# Patient Record
Sex: FEMALE | Race: WHITE | Employment: FULL TIME | URBAN - METROPOLITAN AREA
[De-identification: names, ages, dates, MRNs, and addresses within clinical notes are randomized per-mention and may not be internally consistent; named-entity substitution may affect disease eponyms.]

---

## 2017-01-10 ENCOUNTER — ROUTINE PRENATAL (OUTPATIENT)
Dept: OBGYN CLINIC | Facility: CLINIC | Age: 32
End: 2017-01-10

## 2017-01-10 VITALS — WEIGHT: 143 LBS | SYSTOLIC BLOOD PRESSURE: 96 MMHG | BODY MASS INDEX: 22 KG/M2 | DIASTOLIC BLOOD PRESSURE: 56 MMHG

## 2017-01-10 DIAGNOSIS — Z34.82 OTHER NORMAL PREGNANCY, NOT FIRST, SECOND TRIMESTER: Primary | ICD-10-CM

## 2017-01-10 PROBLEM — Z28.21 INFLUENZA VACCINATION DECLINED: Status: ACTIVE | Noted: 2017-01-10

## 2017-01-10 PROBLEM — O09.899 HISTORY OF PRETERM DELIVERY, CURRENTLY PREGNANT: Status: ACTIVE | Noted: 2017-01-10

## 2017-01-10 LAB
APPEARANCE: CLEAR
MULTISTIX LOT#: NORMAL NUMERIC
PH, URINE: 6 (ref 4.5–8)
SPECIFIC GRAVITY: 1.02 (ref 1–1.03)
URINE-COLOR: YELLOW
UROBILINOGEN,SEMI-QN: 0.2 MG/DL (ref 0–1.9)

## 2017-01-10 NOTE — PROGRESS NOTES
Had normal 1st tri screen. Normal FFDNA, it's another boy. AFP ordered. Declines flu vaccine. Lost 2# since last week. Denies any nausea or vomiting. Has been stressed as  quit on them.

## 2017-02-07 ENCOUNTER — TELEPHONE (OUTPATIENT)
Dept: OBGYN CLINIC | Facility: CLINIC | Age: 32
End: 2017-02-07

## 2017-02-07 DIAGNOSIS — Z34.82 ENCOUNTER FOR SUPERVISION OF OTHER NORMAL PREGNANCY IN SECOND TRIMESTER: Primary | ICD-10-CM

## 2017-02-08 ENCOUNTER — PATIENT MESSAGE (OUTPATIENT)
Dept: OBGYN CLINIC | Facility: CLINIC | Age: 32
End: 2017-02-08

## 2017-02-10 ENCOUNTER — TELEPHONE (OUTPATIENT)
Dept: OBGYN CLINIC | Facility: CLINIC | Age: 32
End: 2017-02-10

## 2017-02-10 NOTE — TELEPHONE ENCOUNTER
Lmtcb. 19w6d. On 1/10/17 pt had AFP ordered by NGUYỄN and it has not been completed yet. Lab postponed for 1 month.

## 2017-02-13 ENCOUNTER — ROUTINE PRENATAL (OUTPATIENT)
Dept: OBGYN CLINIC | Facility: CLINIC | Age: 32
End: 2017-02-13

## 2017-02-13 VITALS
WEIGHT: 154 LBS | SYSTOLIC BLOOD PRESSURE: 120 MMHG | BODY MASS INDEX: 24 KG/M2 | DIASTOLIC BLOOD PRESSURE: 60 MMHG | HEART RATE: 97 BPM

## 2017-02-13 DIAGNOSIS — Z34.92 ENCOUNTER FOR SUPERVISION OF NORMAL PREGNANCY IN SECOND TRIMESTER, UNSPECIFIED GRAVIDITY: Primary | ICD-10-CM

## 2017-02-13 LAB
MULTISTIX LOT#: NORMAL NUMERIC
PH, URINE: 5 (ref 4.5–8)
SPECIFIC GRAVITY: 1.01 (ref 1–1.03)

## 2017-02-13 NOTE — PROGRESS NOTES
Feeling well, + fetal movement. Has [facility] u/s scheduled this Thursday. Denies s/s PTL. Reviewed warning signs.

## 2017-02-16 ENCOUNTER — HOSPITAL ENCOUNTER (OUTPATIENT)
Dept: PERINATAL CARE | Facility: HOSPITAL | Age: 32
Discharge: HOME | End: 2017-02-16
Attending: OBSTETRICS & GYNECOLOGY
Payer: COMMERCIAL

## 2017-02-16 VITALS
HEART RATE: 94 BPM | WEIGHT: 154 LBS | RESPIRATION RATE: 16 BRPM | SYSTOLIC BLOOD PRESSURE: 106 MMHG | BODY MASS INDEX: 24 KG/M2 | DIASTOLIC BLOOD PRESSURE: 65 MMHG

## 2017-02-16 DIAGNOSIS — O09.892 HISTORY OF PRETERM DELIVERY, CURRENTLY PREGNANT, SECOND TRIMESTER: ICD-10-CM

## 2017-02-16 DIAGNOSIS — O09.892 HISTORY OF PRETERM DELIVERY, CURRENTLY PREGNANT, SECOND TRIMESTER: Primary | ICD-10-CM

## 2017-02-16 PROCEDURE — 76811 OB US DETAILED SNGL FETUS: CPT | Performed by: OBSTETRICS & GYNECOLOGY

## 2017-02-16 PROCEDURE — 99213 OFFICE O/P EST LOW 20 MIN: CPT | Performed by: OBSTETRICS & GYNECOLOGY

## 2017-02-16 PROCEDURE — 76811 OB US DETAILED SNGL FETUS: CPT

## 2017-02-16 NOTE — PROGRESS NOTES
Atilio Powell is a 33year old female. Reason for Consult:   History of  delivery at 36wks due to PROM. Denies any problems prior to PROM. She had been moving furniture and then found out her Dad . That night her water broke.  No contra standard deviations below the mean for gestational age and considers the diagnosis of pathologic microcephaly certain when the head circumference is >5 standard deviations below the mean for gestational age.       Most people infected with Zika virus are as . In addition, mosquito-borne Zika virus transmission in [facility] has been extensive. There is also mosquito-borne transmission of Zika virus in the [place] territories of the [place] and [place].         Health c Magna, midline falx, cerebellum, cerebellar lobes, posterior fossa, vermis, cavum septi pellucidi.   Face: eyes normal, profile normal, nose normal, lip normal, palate normal.  Heart: visualized and normal appearance: 3 vessel view, four-chamber, left outfl

## 2017-02-16 NOTE — PROGRESS NOTES
[facility] level 2 ultrasound. Feeling well, no complaints offered. Positive fetal movement.

## 2017-03-24 ENCOUNTER — ROUTINE PRENATAL (OUTPATIENT)
Dept: OBGYN CLINIC | Facility: CLINIC | Age: 32
End: 2017-03-24

## 2017-03-24 VITALS
WEIGHT: 160.19 LBS | BODY MASS INDEX: 25 KG/M2 | DIASTOLIC BLOOD PRESSURE: 66 MMHG | SYSTOLIC BLOOD PRESSURE: 101 MMHG | HEART RATE: 81 BPM

## 2017-03-24 DIAGNOSIS — Z34.82 ENCOUNTER FOR SUPERVISION OF OTHER NORMAL PREGNANCY IN SECOND TRIMESTER: Primary | ICD-10-CM

## 2017-03-24 LAB
APPEARANCE: CLEAR
MULTISTIX LOT#: NORMAL NUMERIC
PH, URINE: 7 (ref 4.5–8)
SPECIFIC GRAVITY: 1.01 (ref 1–1.03)
URINE-COLOR: YELLOW
UROBILINOGEN,SEMI-QN: 0.2 MG/DL (ref 0–1.9)

## 2017-03-24 NOTE — PROGRESS NOTES
Feeling well, active baby. Reviewed normal level II ultrasound, CL 4.5. Denies s/s PTL. Orders given for 3T labs. Offer tdap at next visit. Reviewed warning signs.

## 2017-04-03 ENCOUNTER — APPOINTMENT (OUTPATIENT)
Dept: LAB | Facility: HOSPITAL | Age: 32
End: 2017-04-03
Attending: ADVANCED PRACTICE MIDWIFE
Payer: COMMERCIAL

## 2017-04-03 ENCOUNTER — ROUTINE PRENATAL (OUTPATIENT)
Dept: OBGYN CLINIC | Facility: CLINIC | Age: 32
End: 2017-04-03

## 2017-04-03 VITALS
HEART RATE: 85 BPM | HEIGHT: 67 IN | SYSTOLIC BLOOD PRESSURE: 108 MMHG | BODY MASS INDEX: 25.58 KG/M2 | DIASTOLIC BLOOD PRESSURE: 68 MMHG | WEIGHT: 163 LBS

## 2017-04-03 DIAGNOSIS — Z34.82 ENCOUNTER FOR SUPERVISION OF OTHER NORMAL PREGNANCY IN SECOND TRIMESTER: ICD-10-CM

## 2017-04-03 DIAGNOSIS — Z34.82 OTHER NORMAL PREGNANCY, NOT FIRST, SECOND TRIMESTER: Primary | ICD-10-CM

## 2017-04-03 DIAGNOSIS — Z28.20 VACCINE REFUSED BY PATIENT: ICD-10-CM

## 2017-04-03 PROCEDURE — 36415 COLL VENOUS BLD VENIPUNCTURE: CPT

## 2017-04-03 PROCEDURE — 82950 GLUCOSE TEST: CPT

## 2017-04-03 PROCEDURE — 85027 COMPLETE CBC AUTOMATED: CPT

## 2017-04-03 NOTE — PROGRESS NOTES
Active fetus. Denies abdominal pain, leaking of vaginal fluid or vaginal bleeding. Reports going to complete 3T labs today after ZAHRAA appointment. 28 week packet reviwed, declines Tdap and completed CBE with first child less than 2 years ago.   Reviewed P

## 2017-04-21 ENCOUNTER — ROUTINE PRENATAL (OUTPATIENT)
Dept: OBGYN CLINIC | Facility: CLINIC | Age: 32
End: 2017-04-21

## 2017-04-21 VITALS
DIASTOLIC BLOOD PRESSURE: 71 MMHG | SYSTOLIC BLOOD PRESSURE: 118 MMHG | HEIGHT: 67 IN | WEIGHT: 164.13 LBS | BODY MASS INDEX: 25.76 KG/M2 | HEART RATE: 96 BPM

## 2017-04-21 DIAGNOSIS — Z34.83 OTHER NORMAL PREGNANCY, NOT FIRST, THIRD TRIMESTER: Primary | ICD-10-CM

## 2017-04-21 NOTE — PROGRESS NOTES
Feeling well, active baby. Denies s/s PTL. Concerned regarding FH>dates at previous visits, today measurement within normal range. Patient will consider growth ultrasound if measurements abnormal.  Reviewed normal 3T labs and warning signs.

## 2017-04-22 ENCOUNTER — TELEPHONE (OUTPATIENT)
Dept: OBGYN CLINIC | Facility: CLINIC | Age: 32
End: 2017-04-22

## 2017-05-05 ENCOUNTER — ROUTINE PRENATAL (OUTPATIENT)
Dept: OBGYN CLINIC | Facility: CLINIC | Age: 32
End: 2017-05-05

## 2017-05-05 ENCOUNTER — APPOINTMENT (OUTPATIENT)
Dept: LAB | Facility: HOSPITAL | Age: 32
End: 2017-05-05
Attending: ADVANCED PRACTICE MIDWIFE
Payer: COMMERCIAL

## 2017-05-05 VITALS
DIASTOLIC BLOOD PRESSURE: 73 MMHG | WEIGHT: 167 LBS | BODY MASS INDEX: 26.21 KG/M2 | HEIGHT: 67 IN | HEART RATE: 89 BPM | SYSTOLIC BLOOD PRESSURE: 116 MMHG

## 2017-05-05 DIAGNOSIS — Z34.83 OTHER NORMAL PREGNANCY, NOT FIRST, THIRD TRIMESTER: Primary | ICD-10-CM

## 2017-05-05 DIAGNOSIS — L29.9 ITCHING: ICD-10-CM

## 2017-05-05 PROCEDURE — 82239 BILE ACIDS TOTAL: CPT

## 2017-05-05 PROCEDURE — 36415 COLL VENOUS BLD VENIPUNCTURE: CPT

## 2017-05-05 PROCEDURE — 84450 TRANSFERASE (AST) (SGOT): CPT

## 2017-05-05 PROCEDURE — 84460 ALANINE AMINO (ALT) (SGPT): CPT

## 2017-05-05 NOTE — PROGRESS NOTES
Active baby, denies s/s PTL. Reports small erythematous prutitic bumps on hands, denies rash or itching on any other area of body. Denies abdominal pain. Bile acids and LFTs ordered. FH>dates, growth ultrasound ordered. MFM referral placed by RN.   Rev

## 2017-05-06 ENCOUNTER — TELEPHONE (OUTPATIENT)
Dept: OBGYN CLINIC | Facility: CLINIC | Age: 32
End: 2017-05-06

## 2017-05-08 ENCOUNTER — TELEPHONE (OUTPATIENT)
Dept: PERINATAL CARE | Facility: HOSPITAL | Age: 32
End: 2017-05-08

## 2017-05-19 ENCOUNTER — ROUTINE PRENATAL (OUTPATIENT)
Dept: OBGYN CLINIC | Facility: CLINIC | Age: 32
End: 2017-05-19

## 2017-05-19 VITALS
DIASTOLIC BLOOD PRESSURE: 64 MMHG | SYSTOLIC BLOOD PRESSURE: 111 MMHG | HEIGHT: 67 IN | HEART RATE: 96 BPM | WEIGHT: 169 LBS | BODY MASS INDEX: 26.53 KG/M2

## 2017-05-19 DIAGNOSIS — Z34.83 PRENATAL CARE, SUBSEQUENT PREGNANCY, THIRD TRIMESTER: Primary | ICD-10-CM

## 2017-05-19 NOTE — PROGRESS NOTES
Feeling well, active baby. Denies s/s PTL. Itching slightly improved, reviewed normal LFTs and bile acids. Growth ultrasound scheduled for next week. GBS at next visit.

## 2017-05-25 ENCOUNTER — HOSPITAL ENCOUNTER (OUTPATIENT)
Dept: PERINATAL CARE | Facility: HOSPITAL | Age: 32
Discharge: HOME | End: 2017-05-25
Attending: OBSTETRICS & GYNECOLOGY
Payer: COMMERCIAL

## 2017-05-25 VITALS
HEART RATE: 78 BPM | WEIGHT: 169 LBS | RESPIRATION RATE: 16 BRPM | DIASTOLIC BLOOD PRESSURE: 75 MMHG | BODY MASS INDEX: 26.53 KG/M2 | SYSTOLIC BLOOD PRESSURE: 112 MMHG | HEIGHT: 67 IN

## 2017-05-25 DIAGNOSIS — O09.213 HX OF PTL (PRETERM LABOR), CURRENT PREGNANCY, THIRD TRIMESTER: Primary | ICD-10-CM

## 2017-05-25 DIAGNOSIS — O09.213 HX OF PTL (PRETERM LABOR), CURRENT PREGNANCY, THIRD TRIMESTER: ICD-10-CM

## 2017-05-25 PROCEDURE — 76805 OB US >/= 14 WKS SNGL FETUS: CPT | Performed by: OBSTETRICS & GYNECOLOGY

## 2017-05-25 PROCEDURE — 99212 OFFICE O/P EST SF 10 MIN: CPT | Performed by: OBSTETRICS & GYNECOLOGY

## 2017-05-25 NOTE — PROGRESS NOTES
Burgess Thompson is a 27year old female. Reason for Consult:   History of  delivery at 36wks due to PROM. Denies any problems prior to PROM. She had been moving furniture and then found out her Dad . That night her water broke.  No contra

## 2017-06-05 ENCOUNTER — ROUTINE PRENATAL (OUTPATIENT)
Dept: OBGYN CLINIC | Facility: CLINIC | Age: 32
End: 2017-06-05

## 2017-06-05 VITALS
WEIGHT: 171 LBS | DIASTOLIC BLOOD PRESSURE: 72 MMHG | SYSTOLIC BLOOD PRESSURE: 112 MMHG | BODY MASS INDEX: 26.84 KG/M2 | HEIGHT: 67 IN | HEART RATE: 88 BPM

## 2017-06-05 DIAGNOSIS — Z34.83 PRENATAL CARE, SUBSEQUENT PREGNANCY, THIRD TRIMESTER: Primary | ICD-10-CM

## 2017-06-05 NOTE — PROGRESS NOTES
Hand itching and bumps resolved. MFM ultrasound normal.  Probably will get an epidural.  Vit K and EES ok. Planning CB collection. Circ - yes. Inhouse Peds.   Rev 36 week packet/when to call

## 2017-06-14 ENCOUNTER — ROUTINE PRENATAL (OUTPATIENT)
Dept: OBGYN CLINIC | Facility: CLINIC | Age: 32
End: 2017-06-14

## 2017-06-14 VITALS
HEART RATE: 77 BPM | SYSTOLIC BLOOD PRESSURE: 113 MMHG | BODY MASS INDEX: 27 KG/M2 | DIASTOLIC BLOOD PRESSURE: 71 MMHG | WEIGHT: 170 LBS

## 2017-06-14 DIAGNOSIS — Z34.83 ENCOUNTER FOR SUPERVISION OF OTHER NORMAL PREGNANCY IN THIRD TRIMESTER: Primary | ICD-10-CM

## 2017-06-19 ENCOUNTER — ANESTHESIA (OUTPATIENT)
Dept: OBGYN UNIT | Facility: HOSPITAL | Age: 32
End: 2017-06-19
Payer: COMMERCIAL

## 2017-06-19 ENCOUNTER — ANESTHESIA EVENT (OUTPATIENT)
Dept: OBGYN UNIT | Facility: HOSPITAL | Age: 32
End: 2017-06-19
Payer: COMMERCIAL

## 2017-06-19 ENCOUNTER — HOSPITAL ENCOUNTER (INPATIENT)
Facility: HOSPITAL | Age: 32
LOS: 3 days | Discharge: HOME | End: 2017-06-22
Attending: ADVANCED PRACTICE MIDWIFE | Admitting: OBSTETRICS & GYNECOLOGY
Payer: COMMERCIAL

## 2017-06-19 ENCOUNTER — SURGERY (OUTPATIENT)
Age: 32
End: 2017-06-19
Payer: COMMERCIAL

## 2017-06-19 PROBLEM — Z34.90 TERM PREGNANCY: Status: ACTIVE | Noted: 2017-06-19

## 2017-06-19 PROBLEM — Z34.90 PREGNANCY: Status: ACTIVE | Noted: 2017-06-19

## 2017-06-19 PROCEDURE — 59510 CESAREAN DELIVERY: CPT | Performed by: OBSTETRICS & GYNECOLOGY

## 2017-06-19 PROCEDURE — 99252 IP/OBS CONSLTJ NEW/EST SF 35: CPT | Performed by: OBSTETRICS & GYNECOLOGY

## 2017-06-19 PROCEDURE — 10907ZC DRAINAGE OF AMNIOTIC FLUID, THERAPEUTIC FROM PRODUCTS OF CONCEPTION, VIA NATURAL OR ARTIFICIAL OPENING: ICD-10-PCS | Performed by: OBSTETRICS & GYNECOLOGY

## 2017-06-19 RX ORDER — HYDROMORPHONE HYDROCHLORIDE 1 MG/ML
0.4 INJECTION, SOLUTION INTRAMUSCULAR; INTRAVENOUS; SUBCUTANEOUS EVERY 5 MIN PRN
Status: DISCONTINUED | OUTPATIENT
Start: 2017-06-19 | End: 2017-06-22

## 2017-06-19 RX ORDER — HALOPERIDOL 5 MG/ML
0.25 INJECTION INTRAMUSCULAR ONCE AS NEEDED
Status: ACTIVE | OUTPATIENT
Start: 2017-06-19 | End: 2017-06-19

## 2017-06-19 RX ORDER — BISACODYL 10 MG
10 SUPPOSITORY, RECTAL RECTAL
Status: DISCONTINUED | OUTPATIENT
Start: 2017-06-19 | End: 2017-06-22

## 2017-06-19 RX ORDER — 0.9 % SODIUM CHLORIDE 0.9 %
VIAL (ML) INJECTION
Status: DISPENSED
Start: 2017-06-19 | End: 2017-06-19

## 2017-06-19 RX ORDER — NALBUPHINE HCL 10 MG/ML
2.5 AMPUL (ML) INJECTION
Status: DISCONTINUED | OUTPATIENT
Start: 2017-06-19 | End: 2017-06-22

## 2017-06-19 RX ORDER — SIMETHICONE 80 MG
80 TABLET,CHEWABLE ORAL 3 TIMES DAILY PRN
Status: DISCONTINUED | OUTPATIENT
Start: 2017-06-19 | End: 2017-06-22

## 2017-06-19 RX ORDER — HYDROMORPHONE HYDROCHLORIDE 1 MG/ML
0.6 INJECTION, SOLUTION INTRAMUSCULAR; INTRAVENOUS; SUBCUTANEOUS EVERY 5 MIN PRN
Status: DISCONTINUED | OUTPATIENT
Start: 2017-06-19 | End: 2017-06-22

## 2017-06-19 RX ORDER — SODIUM CHLORIDE 0.9 % (FLUSH) 0.9 %
10 SYRINGE (ML) INJECTION AS NEEDED
Status: DISCONTINUED | OUTPATIENT
Start: 2017-06-19 | End: 2017-06-19 | Stop reason: HOSPADM

## 2017-06-19 RX ORDER — MORPHINE SULFATE 2 MG/ML
2 INJECTION, SOLUTION INTRAMUSCULAR; INTRAVENOUS EVERY 10 MIN PRN
Status: DISCONTINUED | OUTPATIENT
Start: 2017-06-19 | End: 2017-06-22

## 2017-06-19 RX ORDER — NALOXONE HYDROCHLORIDE 0.4 MG/ML
80 INJECTION, SOLUTION INTRAMUSCULAR; INTRAVENOUS; SUBCUTANEOUS AS NEEDED
Status: ACTIVE | OUTPATIENT
Start: 2017-06-19 | End: 2017-06-19

## 2017-06-19 RX ORDER — AMMONIA INHALANTS 0.04 G/.3ML
0.3 INHALANT RESPIRATORY (INHALATION) AS NEEDED
Status: DISCONTINUED | OUTPATIENT
Start: 2017-06-19 | End: 2017-06-19 | Stop reason: HOSPADM

## 2017-06-19 RX ORDER — HYDROXYZINE HYDROCHLORIDE 50 MG/ML
INJECTION, SOLUTION INTRAMUSCULAR
Status: COMPLETED
Start: 2017-06-19 | End: 2017-06-19

## 2017-06-19 RX ORDER — DEXTROSE, SODIUM CHLORIDE, SODIUM LACTATE, POTASSIUM CHLORIDE, AND CALCIUM CHLORIDE 5; .6; .31; .03; .02 G/100ML; G/100ML; G/100ML; G/100ML; G/100ML
INJECTION, SOLUTION INTRAVENOUS CONTINUOUS
Status: DISCONTINUED | OUTPATIENT
Start: 2017-06-19 | End: 2017-06-19 | Stop reason: HOSPADM

## 2017-06-19 RX ORDER — POLYETHYLENE GLYCOL 3350 17 G/17G
17 POWDER, FOR SOLUTION ORAL DAILY PRN
Status: DISCONTINUED | OUTPATIENT
Start: 2017-06-19 | End: 2017-06-22

## 2017-06-19 RX ORDER — LIDOCAINE HYDROCHLORIDE AND EPINEPHRINE 20; 5 MG/ML; UG/ML
INJECTION, SOLUTION EPIDURAL; INFILTRATION; INTRACAUDAL; PERINEURAL
Status: DISCONTINUED
Start: 2017-06-19 | End: 2017-06-19 | Stop reason: WASHOUT

## 2017-06-19 RX ORDER — SODIUM CHLORIDE, SODIUM LACTATE, POTASSIUM CHLORIDE, CALCIUM CHLORIDE 600; 310; 30; 20 MG/100ML; MG/100ML; MG/100ML; MG/100ML
INJECTION, SOLUTION INTRAVENOUS CONTINUOUS
Status: DISCONTINUED | OUTPATIENT
Start: 2017-06-19 | End: 2017-06-22

## 2017-06-19 RX ORDER — PHENYLEPHRINE HCL IN 0.9% NACL 0.5 MG/5ML
SYRINGE (ML) INTRAVENOUS
Status: DISCONTINUED
Start: 2017-06-19 | End: 2017-06-19 | Stop reason: WASHOUT

## 2017-06-19 RX ORDER — AMMONIA INHALANTS 0.04 G/.3ML
0.3 INHALANT RESPIRATORY (INHALATION) AS NEEDED
Status: DISCONTINUED | OUTPATIENT
Start: 2017-06-19 | End: 2017-06-22

## 2017-06-19 RX ORDER — METOCLOPRAMIDE 10 MG/1
TABLET ORAL
Status: DISCONTINUED
Start: 2017-06-19 | End: 2017-06-19 | Stop reason: WASHOUT

## 2017-06-19 RX ORDER — DEXAMETHASONE SODIUM PHOSPHATE 4 MG/ML
VIAL (ML) INJECTION AS NEEDED
Status: DISCONTINUED | OUTPATIENT
Start: 2017-06-19 | End: 2017-06-19 | Stop reason: SURG

## 2017-06-19 RX ORDER — BUPIVACAINE HYDROCHLORIDE 2.5 MG/ML
INJECTION, SOLUTION EPIDURAL; INFILTRATION; INTRACAUDAL AS NEEDED
Status: DISCONTINUED | OUTPATIENT
Start: 2017-06-19 | End: 2017-06-19 | Stop reason: SURG

## 2017-06-19 RX ORDER — SODIUM CHLORIDE, SODIUM LACTATE, POTASSIUM CHLORIDE, CALCIUM CHLORIDE 600; 310; 30; 20 MG/100ML; MG/100ML; MG/100ML; MG/100ML
INJECTION, SOLUTION INTRAVENOUS
Status: DISPENSED
Start: 2017-06-19 | End: 2017-06-19

## 2017-06-19 RX ORDER — EPHEDRINE SULFATE 50 MG/ML
INJECTION, SOLUTION INTRAVENOUS AS NEEDED
Status: DISCONTINUED | OUTPATIENT
Start: 2017-06-19 | End: 2017-06-19 | Stop reason: SURG

## 2017-06-19 RX ORDER — NALBUPHINE HCL 10 MG/ML
10 AMPUL (ML) INJECTION ONCE
Status: COMPLETED | OUTPATIENT
Start: 2017-06-19 | End: 2017-06-19

## 2017-06-19 RX ORDER — SODIUM CHLORIDE 0.9 % (FLUSH) 0.9 %
10 SYRINGE (ML) INJECTION AS NEEDED
Status: DISCONTINUED | OUTPATIENT
Start: 2017-06-19 | End: 2017-06-22

## 2017-06-19 RX ORDER — IBUPROFEN 600 MG/1
600 TABLET ORAL ONCE AS NEEDED
Status: DISCONTINUED | OUTPATIENT
Start: 2017-06-19 | End: 2017-06-19 | Stop reason: HOSPADM

## 2017-06-19 RX ORDER — DEXTROSE, SODIUM CHLORIDE, SODIUM LACTATE, POTASSIUM CHLORIDE, AND CALCIUM CHLORIDE 5; .6; .31; .03; .02 G/100ML; G/100ML; G/100ML; G/100ML; G/100ML
125 INJECTION, SOLUTION INTRAVENOUS CONTINUOUS
Status: DISCONTINUED | OUTPATIENT
Start: 2017-06-19 | End: 2017-06-19 | Stop reason: HOSPADM

## 2017-06-19 RX ORDER — HYDROXYZINE HYDROCHLORIDE 50 MG/ML
50 INJECTION, SOLUTION INTRAMUSCULAR ONCE
Status: COMPLETED | OUTPATIENT
Start: 2017-06-19 | End: 2017-06-19

## 2017-06-19 RX ORDER — EPHEDRINE SULFATE/0.9% NACL/PF 25 MG/5 ML
5 SYRINGE (ML) INTRAVENOUS AS NEEDED
Status: DISCONTINUED | OUTPATIENT
Start: 2017-06-19 | End: 2017-06-19

## 2017-06-19 RX ORDER — HYDROMORPHONE HYDROCHLORIDE 1 MG/ML
0.2 INJECTION, SOLUTION INTRAMUSCULAR; INTRAVENOUS; SUBCUTANEOUS EVERY 5 MIN PRN
Status: DISCONTINUED | OUTPATIENT
Start: 2017-06-19 | End: 2017-06-22

## 2017-06-19 RX ORDER — DEXTROSE, SODIUM CHLORIDE, SODIUM LACTATE, POTASSIUM CHLORIDE, AND CALCIUM CHLORIDE 5; .6; .31; .03; .02 G/100ML; G/100ML; G/100ML; G/100ML; G/100ML
INJECTION, SOLUTION INTRAVENOUS CONTINUOUS
Status: DISCONTINUED | OUTPATIENT
Start: 2017-06-19 | End: 2017-06-22

## 2017-06-19 RX ORDER — ONDANSETRON 2 MG/ML
4 INJECTION INTRAMUSCULAR; INTRAVENOUS ONCE AS NEEDED
Status: ACTIVE | OUTPATIENT
Start: 2017-06-19 | End: 2017-06-19

## 2017-06-19 RX ORDER — MEPERIDINE HYDROCHLORIDE 50 MG/ML
INJECTION INTRAMUSCULAR; INTRAVENOUS; SUBCUTANEOUS AS NEEDED
Status: DISCONTINUED | OUTPATIENT
Start: 2017-06-19 | End: 2017-06-19 | Stop reason: SURG

## 2017-06-19 RX ORDER — DOCUSATE SODIUM 100 MG/1
100 CAPSULE, LIQUID FILLED ORAL
Status: DISCONTINUED | OUTPATIENT
Start: 2017-06-19 | End: 2017-06-20

## 2017-06-19 RX ORDER — ONDANSETRON 2 MG/ML
INJECTION INTRAMUSCULAR; INTRAVENOUS AS NEEDED
Status: DISCONTINUED | OUTPATIENT
Start: 2017-06-19 | End: 2017-06-19 | Stop reason: SURG

## 2017-06-19 RX ORDER — SODIUM CHLORIDE 9 MG/ML
INJECTION, SOLUTION INTRAVENOUS
Status: COMPLETED
Start: 2017-06-19 | End: 2017-06-19

## 2017-06-19 RX ORDER — PHENYLEPHRINE HCL 10 MG/ML
VIAL (ML) INJECTION AS NEEDED
Status: DISCONTINUED | OUTPATIENT
Start: 2017-06-19 | End: 2017-06-19 | Stop reason: SURG

## 2017-06-19 RX ORDER — SODIUM PHOSPHATE, DIBASIC AND SODIUM PHOSPHATE, MONOBASIC 7; 19 G/133ML; G/133ML
1 ENEMA RECTAL ONCE AS NEEDED
Status: DISCONTINUED | OUTPATIENT
Start: 2017-06-19 | End: 2017-06-22

## 2017-06-19 RX ORDER — LIDOCAINE HYDROCHLORIDE 10 MG/ML
30 INJECTION, SOLUTION EPIDURAL; INFILTRATION; INTRACAUDAL; PERINEURAL ONCE
Status: DISCONTINUED | OUTPATIENT
Start: 2017-06-19 | End: 2017-06-19 | Stop reason: HOSPADM

## 2017-06-19 RX ORDER — MORPHINE SULFATE 10 MG/ML
6 INJECTION, SOLUTION INTRAMUSCULAR; INTRAVENOUS EVERY 10 MIN PRN
Status: DISCONTINUED | OUTPATIENT
Start: 2017-06-19 | End: 2017-06-22

## 2017-06-19 RX ORDER — BUPIVACAINE HYDROCHLORIDE 2.5 MG/ML
INJECTION, SOLUTION EPIDURAL; INFILTRATION; INTRACAUDAL
Status: DISPENSED
Start: 2017-06-19 | End: 2017-06-19

## 2017-06-19 RX ORDER — KETOROLAC TROMETHAMINE 30 MG/ML
30 INJECTION, SOLUTION INTRAMUSCULAR; INTRAVENOUS EVERY 6 HOURS PRN
Status: DISCONTINUED | OUTPATIENT
Start: 2017-06-19 | End: 2017-06-20

## 2017-06-19 RX ORDER — ONDANSETRON 2 MG/ML
4 INJECTION INTRAMUSCULAR; INTRAVENOUS EVERY 6 HOURS PRN
Status: DISCONTINUED | OUTPATIENT
Start: 2017-06-19 | End: 2017-06-22

## 2017-06-19 RX ORDER — PRENATAL VIT,CAL 76/IRON/FOLIC 29 MG-1 MG
1 TABLET ORAL DAILY
Status: DISCONTINUED | OUTPATIENT
Start: 2017-06-19 | End: 2017-06-22

## 2017-06-19 RX ORDER — SODIUM CHLORIDE, SODIUM LACTATE, POTASSIUM CHLORIDE, CALCIUM CHLORIDE 600; 310; 30; 20 MG/100ML; MG/100ML; MG/100ML; MG/100ML
INJECTION, SOLUTION INTRAVENOUS CONTINUOUS PRN
Status: DISCONTINUED | OUTPATIENT
Start: 2017-06-19 | End: 2017-06-19 | Stop reason: SURG

## 2017-06-19 RX ORDER — TERBUTALINE SULFATE 1 MG/ML
0.25 INJECTION, SOLUTION SUBCUTANEOUS AS NEEDED
Status: DISCONTINUED | OUTPATIENT
Start: 2017-06-19 | End: 2017-06-19 | Stop reason: HOSPADM

## 2017-06-19 RX ORDER — MORPHINE SULFATE 4 MG/ML
4 INJECTION, SOLUTION INTRAMUSCULAR; INTRAVENOUS EVERY 10 MIN PRN
Status: DISCONTINUED | OUTPATIENT
Start: 2017-06-19 | End: 2017-06-22

## 2017-06-19 RX ORDER — FAMOTIDINE 20 MG/1
TABLET ORAL
Status: DISCONTINUED
Start: 2017-06-19 | End: 2017-06-19 | Stop reason: WASHOUT

## 2017-06-19 RX ORDER — NALBUPHINE HCL 10 MG/ML
AMPUL (ML) INJECTION
Status: COMPLETED
Start: 2017-06-19 | End: 2017-06-19

## 2017-06-19 RX ORDER — HYDROCODONE BITARTRATE AND ACETAMINOPHEN 5; 325 MG/1; MG/1
1 TABLET ORAL AS NEEDED
Status: DISCONTINUED | OUTPATIENT
Start: 2017-06-19 | End: 2017-06-20

## 2017-06-19 RX ORDER — CHLOROPROCAINE HYDROCHLORIDE 30 MG/ML
INJECTION, SOLUTION EPIDURAL; INFILTRATION; INTRACAUDAL; PERINEURAL AS NEEDED
Status: DISCONTINUED | OUTPATIENT
Start: 2017-06-19 | End: 2017-06-19 | Stop reason: SURG

## 2017-06-19 RX ORDER — DEXTROSE, SODIUM CHLORIDE, SODIUM LACTATE, POTASSIUM CHLORIDE, AND CALCIUM CHLORIDE 5; .6; .31; .03; .02 G/100ML; G/100ML; G/100ML; G/100ML; G/100ML
INJECTION, SOLUTION INTRAVENOUS
Status: COMPLETED
Start: 2017-06-19 | End: 2017-06-19

## 2017-06-19 RX ORDER — HYDROCODONE BITARTRATE AND ACETAMINOPHEN 5; 325 MG/1; MG/1
2 TABLET ORAL AS NEEDED
Status: DISCONTINUED | OUTPATIENT
Start: 2017-06-19 | End: 2017-06-22

## 2017-06-19 RX ADMIN — PHENYLEPHRINE HCL 50 MCG: 10 MG/ML VIAL (ML) INJECTION at 13:30:00

## 2017-06-19 RX ADMIN — SODIUM CHLORIDE, SODIUM LACTATE, POTASSIUM CHLORIDE, CALCIUM CHLORIDE: 600; 310; 30; 20 INJECTION, SOLUTION INTRAVENOUS at 13:30:00

## 2017-06-19 RX ADMIN — SODIUM CHLORIDE, SODIUM LACTATE, POTASSIUM CHLORIDE, CALCIUM CHLORIDE: 600; 310; 30; 20 INJECTION, SOLUTION INTRAVENOUS at 13:55:00

## 2017-06-19 RX ADMIN — CHLOROPROCAINE HYDROCHLORIDE 20 ML: 30 INJECTION, SOLUTION EPIDURAL; INFILTRATION; INTRACAUDAL; PERINEURAL at 13:02:00

## 2017-06-19 RX ADMIN — DEXAMETHASONE SODIUM PHOSPHATE 4 MG: 4 MG/ML VIAL (ML) INJECTION at 13:05:00

## 2017-06-19 RX ADMIN — EPHEDRINE SULFATE 10 MG: 50 INJECTION, SOLUTION INTRAVENOUS at 13:31:00

## 2017-06-19 RX ADMIN — EPHEDRINE SULFATE 10 MG: 50 INJECTION, SOLUTION INTRAVENOUS at 13:30:00

## 2017-06-19 RX ADMIN — ONDANSETRON 4 MG: 2 INJECTION INTRAMUSCULAR; INTRAVENOUS at 13:05:00

## 2017-06-19 RX ADMIN — MEPERIDINE HYDROCHLORIDE 50 MG: 50 INJECTION INTRAMUSCULAR; INTRAVENOUS; SUBCUTANEOUS at 13:20:00

## 2017-06-19 RX ADMIN — BUPIVACAINE HYDROCHLORIDE 10 MG: 2.5 INJECTION, SOLUTION EPIDURAL; INFILTRATION; INTRACAUDAL at 10:30:00

## 2017-06-19 RX ADMIN — SODIUM CHLORIDE, SODIUM LACTATE, POTASSIUM CHLORIDE, CALCIUM CHLORIDE: 600; 310; 30; 20 INJECTION, SOLUTION INTRAVENOUS at 13:05:00

## 2017-06-19 RX ADMIN — EPHEDRINE SULFATE 10 MG: 50 INJECTION, SOLUTION INTRAVENOUS at 13:25:00

## 2017-06-19 RX ADMIN — SODIUM CHLORIDE, SODIUM LACTATE, POTASSIUM CHLORIDE, CALCIUM CHLORIDE: 600; 310; 30; 20 INJECTION, SOLUTION INTRAVENOUS at 13:45:00

## 2017-06-19 NOTE — PROGRESS NOTES
[facility]    OB Attending Consult Note    Katelyn Babcock Patient Status:  Observation    3/26/1985 MRN [id number]   Location [address] C-D Attending Monica Sierra CNM   Hosp Day # 0 PCP Raul Hdz     Date of Admission Time   1st Trimester Aneuploidy Risk Assessment       Quad - Down Screen Risk Estimate (Required questions in OE to answer)       Quad - Down Maternal Age Risk (Required questions in OE to answer)       Quad - Trisomy 18 screen Risk Estimate (Required ques Weight Sex Delivery Anes PTL Lv   2 Current            1  09/22/15 36w0d  7 lb (3.175 kg) M NORMAL SPONT EPI Y Y      Complications: Early onset of delivery, delivered, with or without mention of antepartum condition,Premature rupture of membranes i mmHg    Abdomen: FHT present and Fundal height appears appropriate for gestational age and CNM discussed a normal estimated fetal weight based on patient's prenatal care and exam  Fetal Surveillance: 120s-130s BPM; Fetal heart variability: moderate  Fetal progresses to complete beginning to push to see if she can deliver this baby in a relatively short period of time.   We discussed the possibility that if she becomes complete and is pushing but having persistent deep variable decelerations we would offer a

## 2017-06-19 NOTE — LACTATION NOTE
LACTATION NOTE - MOTHER           Problems identified  Problems identified: Knowledge deficit  Problems Identified Other: LGA infant on hypoglycemic protocol    Maternal history  Maternal history:  section    Breastfeeding goal  Breastfeeding goal:

## 2017-06-19 NOTE — OPERATIVE REPORT
[facility]    PATIENT'S NAME: Nasreen Lepe   ATTENDING PHYSICIAN: Arthur Gibson CNM   OPERATING PHYSICIAN: Maurice Pittman MD   PATIENT ACCOUNT#:   [id number]    LOCATION:  [address] [place] [address]  MEDICAL RECORD #:   [id number]       DATE OF BIR was grasped with Kocher clamps, elevated, and underlying rectus muscle dissected off bluntly with the midline being dissected off sharply. The rectus muscle was then easily  in the midline.   The peritoneum was identified and entered in a blunt fa in a running fashion. The skin incision was closed with 4-0 Vicryl in a subcuticular fashion. The patient tolerated the procedure well and was transferred to the recovery room in good condition. All lap and instrument counts were correct.      Dictated B

## 2017-06-19 NOTE — LACTATION NOTE
This note was copied from the chart of [address].   LACTATION NOTE - INFANT    Evaluation Type  Evaluation Type: Inpatient    Problems & Assessment  Problems Diagnosed or Identified: Sleepy  Problems: comment/detail: LGA on hypoglycemic protocol  Infant

## 2017-06-19 NOTE — ANESTHESIA PROCEDURE NOTES
Labor Analgesia  Performed by: Marce Frederick  Authorized by: Marce Frederick    Patient Location:  OB  Start Time:  6/19/2017 10:30 AM  End Time:  6/19/2017 10:38 AM  Reason for Block: labor epidural    Anesthesiologist:  Edna Henderson

## 2017-06-19 NOTE — PROGRESS NOTES
[facility]      Labor Progress Note    Sheri Mack Patient Status:  Inpatient    3/26/1985 MRN [id number]   Location [address] C-D Attending Eleni Askew CNM   Hosp Day # 0 PCP HARSH RUSS     SUBJECTIVE:    Interval

## 2017-06-19 NOTE — ANESTHESIA PREPROCEDURE EVALUATION
Anesthesia PreOp Note    HPI:     Annie Igleisas is a 27year old female who presents for preoperative consultation requested by: * No surgeons listed *    Date of Surgery: 6/19/2017    * No procedures listed *  Indication: * No pre-op diagnosis entered Terbutaline Sulfate (BRETHINE) 1 MG/ML injection 0.25 mg 0.25 mg Subcutaneous PRN Josias Haynes CNM     citric acid-sodium citrate (BICITRA) solution 30 mL 30 mL Oral PRN Ghada Renteria CNM     Ammonia Aromatic (ammonia) nasal solution 0.3 mL 0.3 mL Marcelino Comment: not during pregnancy    Drug Use: No    Sexual Activity: Not on file   Not on file  Other Topics Concern    Blood Transfusions No    Caffeine Concern No    Occupational Exposure No    Weight Concern No    Special Diet No    Exercise No    Seat Bel

## 2017-06-19 NOTE — H&P
[facility] Patient Status:  Observation    3/26/1985 MRN [id number]   Location [facility] Attending Kimmy Chapa, [address] Day # 0 PCP Vargas Teague     Date of Admission:   • Heart Disorder Maternal Cousin Male       at 43 from heart issue   • Other[other] [OTHER] Paternal Grandmother      [place] disease     Social History:    Smoking status: Never Smoker     Smokeless tobacco: Never Used    Alcohol Use: Yes  1.0 Pregnancy  Active labor  Reassuring fetal status  Desires epidural for pain management          Risks, benefits, alternatives and possible complications have been discussed in detail with the patient.    Pre-admission, admission, and post admission procedur

## 2017-06-19 NOTE — PROGRESS NOTES
[facility]      Labor Progress Note    Dionicio Rodriguez Patient Status:  Inpatient    3/26/1985 MRN [id number]   Location [address] C-D Attending Clara Prado CNM   Hosp Day # 0 PCP HARSH RUSS     SUBJECTIVE:    Interval

## 2017-06-19 NOTE — ANESTHESIA POSTPROCEDURE EVALUATION
Patient: Aidee Austin    Procedure Summary     Date Anesthesia Start Anesthesia Stop Room / Location    17 [id number] EM L+D OR       Procedure Diagnosis Surgeon Responsible Provider     SECTION (N/A ) (primary c/s for fetal intolerance t

## 2017-06-19 NOTE — BRIEF OP NOTE
[facility]OS[facility] - [facility]     Section Delivery Note    Annie Getting Patient Status:  Observation    3/26/1985 MRN [id number]   Location [address] C-D Attending Aga White, [address] [facility] Day # 0 [facility] AND EDMUND RUSS     Pre Op Diag

## 2017-06-20 RX ORDER — IBUPROFEN 600 MG/1
600 TABLET ORAL EVERY 6 HOURS PRN
Status: DISCONTINUED | OUTPATIENT
Start: 2017-06-20 | End: 2017-06-22

## 2017-06-20 RX ORDER — DOCUSATE SODIUM 100 MG/1
100 CAPSULE, LIQUID FILLED ORAL 2 TIMES DAILY
Status: DISCONTINUED | OUTPATIENT
Start: 2017-06-20 | End: 2017-06-22

## 2017-06-20 RX ORDER — HYDROCODONE BITARTRATE AND ACETAMINOPHEN 5; 325 MG/1; MG/1
1 TABLET ORAL EVERY 6 HOURS PRN
Status: DISCONTINUED | OUTPATIENT
Start: 2017-06-20 | End: 2017-06-22

## 2017-06-20 NOTE — PROGRESS NOTES
[facility]    Post-Partum Caesarean Section Progress Note    Nena Hodges Patient Status:  Inpatient    3/26/1985 MRN [id number]   Location [facility] 3SE Attending Viv Eaton, [address] Day # 1  [address] migraines and is beginning to have a headache which she thinks is associated with not eating yet  Plan to have patient up in chair and ambulate  Or to be removed today  Will advance diet   Begin PO pain medication today      Ani Walter MD  6/20/2

## 2017-06-20 NOTE — LACTATION NOTE
LACTATION NOTE - MOTHER           Problems identified  Problems identified: Knowledge deficit  Problems Identified Other: BS completed    Maternal history  Maternal history:  section    Breastfeeding goal  Breastfeeding goal: To maintain breast mil

## 2017-06-21 NOTE — PROGRESS NOTES
POD 2  Pt doing well, no c/o. Alert and oriented, nad  abd soft, nt, incision c/d/i  Ext nt    A/p POD 2  Pt doing well no c/o. Ambulate.

## 2017-06-21 NOTE — PLAN OF CARE
Problem: BREAST FEEDING  Goal: Optimize infant feeding at the breast  INTERVENTIONS:  - Initiate breast feeding within first hour after birth. - Monitor effectiveness of current breast feeding efforts.   - Assess support systems available to mother/family Outcome: Adequate for Discharge

## 2017-06-22 VITALS
HEART RATE: 83 BPM | BODY MASS INDEX: 27 KG/M2 | OXYGEN SATURATION: 97 % | WEIGHT: 172 LBS | DIASTOLIC BLOOD PRESSURE: 64 MMHG | TEMPERATURE: 98 F | RESPIRATION RATE: 16 BRPM | HEIGHT: 67 IN | SYSTOLIC BLOOD PRESSURE: 109 MMHG

## 2017-06-22 RX ORDER — IBUPROFEN 600 MG/1
600 TABLET ORAL EVERY 6 HOURS PRN
Qty: 30 TABLET | Refills: 0 | Status: SHIPPED | OUTPATIENT
Start: 2017-06-22 | End: 2017-08-02

## 2017-06-22 RX ORDER — HYDROCODONE BITARTRATE AND ACETAMINOPHEN 5; 325 MG/1; MG/1
1 TABLET ORAL EVERY 6 HOURS PRN
Qty: 30 TABLET | Refills: 0 | Status: SHIPPED | OUTPATIENT
Start: 2017-06-22 | End: 2017-08-02

## 2017-06-22 RX ORDER — PSEUDOEPHEDRINE HCL 30 MG
100 TABLET ORAL 2 TIMES DAILY
Qty: 30 CAPSULE | Refills: 0 | Status: SHIPPED | OUTPATIENT
Start: 2017-06-22 | End: 2017-08-02

## 2017-06-22 NOTE — LACTATION NOTE
LACTATION NOTE - MOTHER           Problems identified  Problems identified: Knowledge deficit  Problems Identified Other: endometriosis, HPV, colposcopy    Maternal history  Maternal history:  section    Breastfeeding goal  Breastfeeding goal: To m

## 2017-06-22 NOTE — PROGRESS NOTES
DISCHARGE ORDER RECEIVED FROM MD. POSTPARTUM DISCHARGE FOLDER GIVEN. DISCHARGE MEDICATION FORM REVIEWED. ID BANDS MATCHED WITH BABY BAND. PATIENT INFORMED WHEN TO MAKE FOLLOW-UP APPOINTMENT WITH OB.    MOTHER INTERACTING WITH BABY APPROPRIATELY.  VERBALIZED

## 2017-06-22 NOTE — LACTATION NOTE
This note was copied from the chart of [address].   LACTATION NOTE - INFANT    Evaluation Type  Evaluation Type: Inpatient    Problems & Assessment  Problems Diagnosed or Identified: Sleepy  Problems: comment/detail: nsg well, denies soreness  Infant As

## 2017-06-22 NOTE — DISCHARGE SUMMARY
[facility] Caesarean Section Discharge Summary/Note    Chanell Doran Patient Status:  Inpatient    3/26/1985 MRN [id number]   Location [facility] 3SE Attending Delisa Hunt, [address] [facility] Day # 3 PCP Edilia 2679 pregnancy  Fetal intolerance to labor, delivered, current hospitalization    Pregnancy        Term pregnancy        Fetal intolerance to labor, delivered, current hospitalization          Secondary Diagnosis: None    Primary OB Clinician: [address] Orders    docusate sodium 100 MG Oral Cap  Take 100 mg by mouth 2 (two) times daily. ibuprofen 600 MG Oral Tab  Take 1 tablet (600 mg total) by mouth every 6 (six) hours as needed.       Home Meds - Unchanged    PRENATAL MULTI +DHA (PNV WITH DHA) 27-0.8-

## 2017-07-11 ENCOUNTER — POSTPARTUM (OUTPATIENT)
Dept: OBGYN CLINIC | Facility: CLINIC | Age: 32
End: 2017-07-11

## 2017-07-11 VITALS
BODY MASS INDEX: 23 KG/M2 | DIASTOLIC BLOOD PRESSURE: 78 MMHG | SYSTOLIC BLOOD PRESSURE: 122 MMHG | WEIGHT: 147 LBS | HEART RATE: 79 BPM

## 2017-07-11 DIAGNOSIS — Z98.890 POST-OPERATIVE STATE: Primary | ICD-10-CM

## 2017-07-11 PROCEDURE — 99024 POSTOP FOLLOW-UP VISIT: CPT | Performed by: OBSTETRICS & GYNECOLOGY

## 2017-07-12 NOTE — PROGRESS NOTES
KEVAN Dodson is a 27year old female  here for post C/S wound check. Patient delivered a  male infant on 2017 via primary CSx due to fetal decelerations and was found to be a persistent OP presentation.   Pt states she is voiding sodium 100 MG Oral Cap, Take 100 mg by mouth 2 (two) times daily. , Disp: 30 capsule, Rfl: 0  •  ibuprofen 600 MG Oral Tab, Take 1 tablet (600 mg total) by mouth every 6 (six) hours as needed. , Disp: 30 tablet, Rfl: 0  •  HYDROcodone-acetaminophen 5-325 MG

## 2017-07-24 ENCOUNTER — TELEPHONE (OUTPATIENT)
Dept: OBGYN CLINIC | Facility: CLINIC | Age: 32
End: 2017-07-24

## 2017-07-24 NOTE — TELEPHONE ENCOUNTER
Pt in office requesting Declaration of Birth form to be signed by midwife. Form showed to MBW, MBW reviewed form and looked pt's info up in computer and signed form for pt. Copy made of form to keep for our records and pt given original form.  Pt agrees wit

## 2017-08-02 ENCOUNTER — POSTPARTUM (OUTPATIENT)
Dept: OBGYN CLINIC | Facility: CLINIC | Age: 32
End: 2017-08-02

## 2017-08-02 VITALS
BODY MASS INDEX: 23 KG/M2 | SYSTOLIC BLOOD PRESSURE: 100 MMHG | HEART RATE: 75 BPM | WEIGHT: 146 LBS | DIASTOLIC BLOOD PRESSURE: 64 MMHG

## 2017-08-02 NOTE — PROGRESS NOTES
HPI:    Patient ID: Burgess Thompson is a 27year old female who presents form her 6 week PP visit s/p LTCS. Pt is feeling well without complaints. Infant is breastfeeding successfully with good weight gain.   Pt has a supportive family and denies any abu

## 2017-09-22 ENCOUNTER — TELEPHONE (OUTPATIENT)
Dept: OBGYN CLINIC | Facility: CLINIC | Age: 32
End: 2017-09-22

## 2017-11-09 ENCOUNTER — TELEPHONE (OUTPATIENT)
Dept: PEDIATRICS CLINIC | Facility: CLINIC | Age: 32
End: 2017-11-09

## 2017-11-09 RX ORDER — FLUCONAZOLE 150 MG/1
150 TABLET ORAL ONCE
Qty: 2 TABLET | Refills: 0 | Status: SHIPPED | OUTPATIENT
Start: 2017-11-09 | End: 2017-11-09

## 2017-11-09 NOTE — TELEPHONE ENCOUNTER
Infant has had chronic thrush. This is the first time pt actually contracted it. Has been on topical nystatin x1 week w/o relief. Experiencing nipple pain, cracking & bleeding. Advised pt I will speak w/  BR &  Get back to her. Pharmacy confirmed.  Discusse

## 2017-11-09 NOTE — TELEPHONE ENCOUNTER
Order placed for Diflucan x2 & called pt to notify. Her pharmacy is not a compounding pharmacy so discussed having rx for APNO sent to [facility]. Advised pt on use & that pharmacy will contact er for delivery arrangements.  Pt verbalized an understanding & agr

## 2017-11-11 ENCOUNTER — OFFICE VISIT (OUTPATIENT)
Dept: OBGYN CLINIC | Facility: CLINIC | Age: 32
End: 2017-11-11

## 2017-11-11 ENCOUNTER — TELEPHONE (OUTPATIENT)
Dept: OBGYN CLINIC | Facility: CLINIC | Age: 32
End: 2017-11-11

## 2017-11-11 VITALS
BODY MASS INDEX: 22 KG/M2 | HEART RATE: 83 BPM | WEIGHT: 139 LBS | DIASTOLIC BLOOD PRESSURE: 72 MMHG | TEMPERATURE: 98 F | SYSTOLIC BLOOD PRESSURE: 111 MMHG

## 2017-11-11 DIAGNOSIS — B37.89 YEAST INFECTION OF NIPPLE, POSTPARTUM: Primary | ICD-10-CM

## 2017-11-11 PROCEDURE — 99213 OFFICE O/P EST LOW 20 MIN: CPT | Performed by: ADVANCED PRACTICE MIDWIFE

## 2017-11-11 RX ORDER — FLUCONAZOLE 200 MG/1
TABLET ORAL
Qty: 44 TABLET | Refills: 0 | Status: SHIPPED | OUTPATIENT
Start: 2017-11-11 | End: 2019-09-25 | Stop reason: ALTCHOICE

## 2017-11-11 RX ORDER — CLOTRIMAZOLE 1 %
1 CREAM (GRAM) TOPICAL 2 TIMES DAILY
Qty: 1 TUBE | Refills: 0 | Status: SHIPPED | OUTPATIENT
Start: 2017-11-11 | End: 2017-11-25

## 2017-11-11 NOTE — PROGRESS NOTES
HPI:    Patient ID: Shelly Hutton is a 27year old female who presents at 12 weeks PP with sore, cracked and bleeding nipples. Pt is exclusively breast feeding. Pt's 3 y/o son was dx with thrush which she believes he passed to the 17 wk old son.   Infants [id number]

## 2017-11-11 NOTE — TELEPHONE ENCOUNTER
Pt states thrush is worsening. on both breasts now. Cracked, bleeding, itchy & painful. Offered appt. Pt accepted.  Agrees w/ plan

## 2019-05-28 ENCOUNTER — TELEPHONE (OUTPATIENT)
Dept: OBGYN CLINIC | Facility: CLINIC | Age: 34
End: 2019-05-28

## 2019-05-28 NOTE — TELEPHONE ENCOUNTER
Since pt had her last baby she was diagnosed w/ hashimotos. Her period was 2 weeks late & she thought she was pregnant but just got her period. Pt has concerns if it will affect future pregnancies.  Advised pt to schedule an annual exam & add in preconceptu

## 2019-09-25 ENCOUNTER — OFFICE VISIT (OUTPATIENT)
Dept: OBGYN CLINIC | Facility: CLINIC | Age: 34
End: 2019-09-25
Payer: COMMERCIAL

## 2019-09-25 ENCOUNTER — APPOINTMENT (OUTPATIENT)
Dept: LAB | Facility: HOSPITAL | Age: 34
End: 2019-09-25
Attending: ADVANCED PRACTICE MIDWIFE
Payer: COMMERCIAL

## 2019-09-25 VITALS
DIASTOLIC BLOOD PRESSURE: 71 MMHG | WEIGHT: 148 LBS | HEART RATE: 74 BPM | SYSTOLIC BLOOD PRESSURE: 106 MMHG | HEIGHT: 66 IN | BODY MASS INDEX: 23.78 KG/M2

## 2019-09-25 DIAGNOSIS — Z01.419 WOMEN'S ANNUAL ROUTINE GYNECOLOGICAL EXAMINATION: Primary | ICD-10-CM

## 2019-09-25 DIAGNOSIS — E03.8 OTHER SPECIFIED HYPOTHYROIDISM: ICD-10-CM

## 2019-09-25 DIAGNOSIS — N92.6 IRREGULAR MENSES: ICD-10-CM

## 2019-09-25 PROBLEM — Z34.90 PREGNANCY: Status: RESOLVED | Noted: 2017-06-19 | Resolved: 2019-09-25

## 2019-09-25 PROBLEM — Z28.21 INFLUENZA VACCINATION DECLINED: Status: RESOLVED | Noted: 2017-01-10 | Resolved: 2019-09-25

## 2019-09-25 PROBLEM — O09.899 HISTORY OF PRETERM DELIVERY, CURRENTLY PREGNANT: Status: RESOLVED | Noted: 2017-01-10 | Resolved: 2019-09-25

## 2019-09-25 PROBLEM — Z34.90 TERM PREGNANCY: Status: RESOLVED | Noted: 2017-06-19 | Resolved: 2019-09-25

## 2019-09-25 LAB
B-HCG SERPL-ACNC: <1 MIU/ML
CONTROL LINE PRESENT WITH A CLEAR BACKGROUND (YES/NO): YES YES/NO
DEPRECATED RDW RBC AUTO: 41.1 FL (ref 35.1–46.3)
ERYTHROCYTE [DISTWIDTH] IN BLOOD BY AUTOMATED COUNT: 12.2 % (ref 11–15)
HCT VFR BLD AUTO: 39.3 % (ref 35–48)
HGB BLD-MCNC: 13.2 G/DL (ref 12–16)
KIT LOT #: NORMAL NUMERIC
MCH RBC QN AUTO: 31.1 PG (ref 26–34)
MCHC RBC AUTO-ENTMCNC: 33.6 G/DL (ref 31–37)
MCV RBC AUTO: 92.5 FL (ref 80–100)
PLATELET # BLD AUTO: 360 10(3)UL (ref 150–450)
PREGNANCY TEST, URINE: NEGATIVE
RBC # BLD AUTO: 4.25 X10(6)UL (ref 3.8–5.3)
WBC # BLD AUTO: 7.7 X10(3) UL (ref 4–11)

## 2019-09-25 PROCEDURE — 99395 PREV VISIT EST AGE 18-39: CPT | Performed by: ADVANCED PRACTICE MIDWIFE

## 2019-09-25 PROCEDURE — 81025 URINE PREGNANCY TEST: CPT | Performed by: ADVANCED PRACTICE MIDWIFE

## 2019-09-25 PROCEDURE — 85027 COMPLETE CBC AUTOMATED: CPT | Performed by: ADVANCED PRACTICE MIDWIFE

## 2019-09-25 PROCEDURE — 36415 COLL VENOUS BLD VENIPUNCTURE: CPT | Performed by: ADVANCED PRACTICE MIDWIFE

## 2019-09-25 PROCEDURE — 84702 CHORIONIC GONADOTROPIN TEST: CPT | Performed by: ADVANCED PRACTICE MIDWIFE

## 2019-09-25 RX ORDER — LEVOTHYROXINE SODIUM 0.05 MG/1
75 TABLET ORAL
Refills: 11 | COMMUNITY
Start: 2019-04-30 | End: 2019-09-25 | Stop reason: ALTCHOICE

## 2019-09-25 RX ORDER — LEVOTHYROXINE SODIUM 0.07 MG/1
TABLET ORAL
COMMUNITY
Start: 2019-09-21 | End: 2019-11-25

## 2019-09-25 NOTE — PROGRESS NOTES
HPI:    Patient ID: Neel Sultana is a 37year old female who presents for her annual gyne exam.  Pt repots menses q 28 days x 4 days until June of this year when she was pregnant and had a SAB.   Pt had a positive pregnancy test followed by a SAB at about 6 tenderness and no discharge. No erythema, tenderness or bleeding in the vagina. No foreign body in the vagina. No signs of injury around the vagina. No vaginal discharge found. Neurological: She is alert and oriented to person, place, and time.    Skin: S

## 2019-09-26 LAB — HPV I/H RISK 1 DNA SPEC QL NAA+PROBE: NEGATIVE

## 2019-09-30 LAB
LAST PAP RESULT: NORMAL
PAP HISTORY (OTHER THAN LAST PAP): NORMAL

## 2019-10-21 ENCOUNTER — OFFICE VISIT (OUTPATIENT)
Dept: OBGYN CLINIC | Facility: CLINIC | Age: 34
End: 2019-10-21
Payer: COMMERCIAL

## 2019-10-21 VITALS — BODY MASS INDEX: 21 KG/M2 | WEIGHT: 128 LBS

## 2019-10-21 DIAGNOSIS — N94.6 DYSMENORRHEA: Primary | ICD-10-CM

## 2019-10-21 PROCEDURE — 99213 OFFICE O/P EST LOW 20 MIN: CPT | Performed by: OBSTETRICS & GYNECOLOGY

## 2019-10-21 NOTE — PROGRESS NOTES
[facility], [facility]  Obstetrics and Gynecology  Focused Gynecology Problem Exam  Mandi Burgos MD    Birgit Hadley is a 37year old female presenting for Gyn Exam (severe cramping onlu during menses x5mos now)  .     HPI:   Patient presents with:  Gyn Exam: se Monthly, every 28-30 days  Period Duration (Days): 2 days for the last 4 months (after SAB 6/2019), previously 4 days  Period Flow: Heavy for the 2 days except the last menses that was light, passing clots, changing pads frequently                       Pa Smokeless tobacco: Never Used    Substance and Sexual Activity      Alcohol use:  Yes        Alcohol/week: 1.7 standard drinks        Types: 2 Glasses of wine per week        Comment: not during pregnancy      Drug use: No      Sexual activity: Not on file normal size     ASSESSMENT:    A: 37 y.o. [id number] with dysmenorrhea over the last 4 months.   Patient with normal pelvic exam.    (N94.6) Dysmenorrhea  (primary encounter diagnosis)      PLAN:   I counseled patient on possible etiologies of dysmenorrhea inc

## 2019-10-29 ENCOUNTER — PATIENT MESSAGE (OUTPATIENT)
Dept: OBGYN CLINIC | Facility: CLINIC | Age: 34
End: 2019-10-29

## 2019-10-29 DIAGNOSIS — R10.2 PELVIC PAIN: Primary | ICD-10-CM

## 2019-11-06 ENCOUNTER — PATIENT MESSAGE (OUTPATIENT)
Dept: OBGYN CLINIC | Facility: CLINIC | Age: 34
End: 2019-11-06

## 2019-11-06 NOTE — TELEPHONE ENCOUNTER
I spoke to patient on the phone today.   We will cancel today's ultrasound which was for dysmenorrhea due to her positive pregnancy test she will follow-up for a pregnancy confirmation visit at about 6 weeks and we will plan for an early ultrasound onset at

## 2019-11-25 ENCOUNTER — OFFICE VISIT (OUTPATIENT)
Dept: OBGYN CLINIC | Facility: CLINIC | Age: 34
End: 2019-11-25
Payer: COMMERCIAL

## 2019-11-25 VITALS
DIASTOLIC BLOOD PRESSURE: 57 MMHG | HEART RATE: 78 BPM | BODY MASS INDEX: 24 KG/M2 | WEIGHT: 149 LBS | SYSTOLIC BLOOD PRESSURE: 96 MMHG

## 2019-11-25 DIAGNOSIS — N92.6 MISSED MENSES: ICD-10-CM

## 2019-11-25 DIAGNOSIS — Z32.01 PREGNANCY EXAMINATION OR TEST, POSITIVE RESULT: Primary | ICD-10-CM

## 2019-11-25 PROCEDURE — 81025 URINE PREGNANCY TEST: CPT | Performed by: OBSTETRICS & GYNECOLOGY

## 2019-11-25 PROCEDURE — 99213 OFFICE O/P EST LOW 20 MIN: CPT | Performed by: OBSTETRICS & GYNECOLOGY

## 2019-11-25 RX ORDER — LEVOTHYROXINE SODIUM 88 MCG
88 TABLET ORAL
Refills: 2 | COMMUNITY
Start: 2019-11-07

## 2019-11-25 NOTE — PROGRESS NOTES
[facility], [facility]  Obstetrics and Gynecology  Pregnancy Confirmation  Macie Luke MD    HPI     Jccelia Lou is a 37year old [id number] with Patient's last menstrual period was 10/11/2019 (exact date). who presents for pregnancy confirmation.      Patient had Performed by Sukhjinder Mitchell MD at [facility] L+D OR   • COLPOSCOPY, CERVIX W/UPPER ADJACENT VAGINA; W/BIOPSY(S), CERVIX  2005    Paps Negative for HPV since   • OTHER SURGICAL HISTORY  2008    [place] Teeth removed     Allergies   No Known Allergies  Medica file      Stress: Not on file    Relationships      Social connections:        Talks on phone: Not on file        Gets together: Not on file        Attends Zoroastrian service: Not on file        Active member of club or organization: Not on file        Atte aneuploidy screening versus amnio or [facility] for diagnosis. Pt is considering cfDNA and will let me know. Discussed flu vaccine recommendations which she has not had and will have at her next visit.   Discussed recurrence risk of  delivery and options

## 2019-12-09 ENCOUNTER — OFFICE VISIT (OUTPATIENT)
Dept: OBGYN CLINIC | Facility: CLINIC | Age: 34
End: 2019-12-09
Payer: COMMERCIAL

## 2019-12-09 VITALS
WEIGHT: 150 LBS | BODY MASS INDEX: 24 KG/M2 | DIASTOLIC BLOOD PRESSURE: 70 MMHG | SYSTOLIC BLOOD PRESSURE: 108 MMHG | HEART RATE: 80 BPM

## 2019-12-09 DIAGNOSIS — Z87.59 ULTRASOUND SCAN TO CONFIRM FETAL VIABILITY WITH HISTORY OF MISCARRIAGE: ICD-10-CM

## 2019-12-09 DIAGNOSIS — Z32.01 PREGNANCY EXAMINATION OR TEST, POSITIVE RESULT: Primary | ICD-10-CM

## 2019-12-09 DIAGNOSIS — O36.80X0 ULTRASOUND SCAN TO CONFIRM FETAL VIABILITY WITH HISTORY OF MISCARRIAGE: ICD-10-CM

## 2019-12-09 PROCEDURE — 76817 TRANSVAGINAL US OBSTETRIC: CPT | Performed by: OBSTETRICS & GYNECOLOGY

## 2019-12-09 PROCEDURE — 99214 OFFICE O/P EST MOD 30 MIN: CPT | Performed by: OBSTETRICS & GYNECOLOGY

## 2019-12-09 NOTE — PROGRESS NOTES
[facility], [facility]  Obstetrics and Gynecology  Ultrasound Visit  Nick Storey MD    KEVAN Moran is a 37year old [id number] with Patient's last menstrual period was 10/11/2019 (exact date). who presents for ultrasound to confirm viability and dating. for HPV since   • OTHER SURGICAL HISTORY  2008    [place] Teeth removed     Allergies   No Known Allergies  Medications     Current Outpatient Medications   Medication Sig Dispense Refill   • SYNTHROID 88 MCG Oral Tab Take 88 mcg by mouth once daily.   2   • Attends Lutheran service: Not on file        Active member of club or organization: Not on file        Attends meetings of clubs or organizations: Not on file        Relationship status: Not on file      Intimate partner violence:        Fear of current o miscarriage      Plan   Discussed signs/symptoms of early pregnancy. Discussed diet, exercise, activity and prenatal vitamins. Miscarriage precautions given. Discussed routine prenatal labs which will be done at RN visit.   Pt counseled on cell free DNA

## 2019-12-18 ENCOUNTER — PATIENT MESSAGE (OUTPATIENT)
Dept: OBGYN CLINIC | Facility: CLINIC | Age: 34
End: 2019-12-18

## 2019-12-23 ENCOUNTER — NURSE ONLY (OUTPATIENT)
Dept: OBGYN CLINIC | Facility: CLINIC | Age: 34
End: 2019-12-23
Payer: COMMERCIAL

## 2019-12-23 ENCOUNTER — LAB ENCOUNTER (OUTPATIENT)
Dept: LAB | Facility: HOSPITAL | Age: 34
End: 2019-12-23
Attending: OBSTETRICS & GYNECOLOGY
Payer: COMMERCIAL

## 2019-12-23 DIAGNOSIS — Z34.81 ENCOUNTER FOR SUPERVISION OF OTHER NORMAL PREGNANCY IN FIRST TRIMESTER: Primary | ICD-10-CM

## 2019-12-23 DIAGNOSIS — E06.3 HASHIMOTO'S DISEASE: ICD-10-CM

## 2019-12-23 DIAGNOSIS — O09.521 AMA (ADVANCED MATERNAL AGE) MULTIGRAVIDA 35+, FIRST TRIMESTER: ICD-10-CM

## 2019-12-23 DIAGNOSIS — O34.219 HISTORY OF CESAREAN DELIVERY, CURRENTLY PREGNANT: ICD-10-CM

## 2019-12-23 PROCEDURE — 86900 BLOOD TYPING SEROLOGIC ABO: CPT | Performed by: OBSTETRICS & GYNECOLOGY

## 2019-12-23 PROCEDURE — 86901 BLOOD TYPING SEROLOGIC RH(D): CPT | Performed by: OBSTETRICS & GYNECOLOGY

## 2019-12-23 PROCEDURE — 84439 ASSAY OF FREE THYROXINE: CPT

## 2019-12-23 PROCEDURE — 84443 ASSAY THYROID STIM HORMONE: CPT

## 2019-12-23 PROCEDURE — 87340 HEPATITIS B SURFACE AG IA: CPT | Performed by: OBSTETRICS & GYNECOLOGY

## 2019-12-23 PROCEDURE — 86762 RUBELLA ANTIBODY: CPT

## 2019-12-23 PROCEDURE — 36415 COLL VENOUS BLD VENIPUNCTURE: CPT | Performed by: OBSTETRICS & GYNECOLOGY

## 2019-12-23 PROCEDURE — 87389 HIV-1 AG W/HIV-1&-2 AB AG IA: CPT | Performed by: OBSTETRICS & GYNECOLOGY

## 2019-12-23 PROCEDURE — 86850 RBC ANTIBODY SCREEN: CPT

## 2019-12-23 PROCEDURE — 86780 TREPONEMA PALLIDUM: CPT | Performed by: OBSTETRICS & GYNECOLOGY

## 2019-12-23 PROCEDURE — 81003 URINALYSIS AUTO W/O SCOPE: CPT | Performed by: OBSTETRICS & GYNECOLOGY

## 2019-12-23 PROCEDURE — 87086 URINE CULTURE/COLONY COUNT: CPT

## 2019-12-23 PROCEDURE — 85025 COMPLETE CBC W/AUTO DIFF WBC: CPT | Performed by: OBSTETRICS & GYNECOLOGY

## 2019-12-23 NOTE — TELEPHONE ENCOUNTER
RN returned call to pt. Pt indicates she checked her notes from her initial appt with TALI and has decided to only complete the [place] [facility] Innatal testing. [facility] FTS order cancelled. Requisition for [employer] [place] faxed to mobile draw fax number.   Requisition sca

## 2019-12-23 NOTE — PROGRESS NOTES
Pt is a 37 y.o.  with an EDC of 2020 based on sure LMP with a regular 26 day cycle here for RN OB education.   RN reviews education materials with patient including but not limited to: plan of care, safe medications, guidance on nutrition, travel,

## 2020-01-03 ENCOUNTER — INITIAL PRENATAL (OUTPATIENT)
Dept: OBGYN CLINIC | Facility: CLINIC | Age: 35
End: 2020-01-03
Payer: COMMERCIAL

## 2020-01-03 VITALS
SYSTOLIC BLOOD PRESSURE: 107 MMHG | DIASTOLIC BLOOD PRESSURE: 65 MMHG | WEIGHT: 152.38 LBS | HEART RATE: 77 BPM | BODY MASS INDEX: 25 KG/M2

## 2020-01-03 DIAGNOSIS — O09.529 ANTEPARTUM MULTIGRAVIDA OF ADVANCED MATERNAL AGE: ICD-10-CM

## 2020-01-03 DIAGNOSIS — Z34.81 ENCOUNTER FOR SUPERVISION OF OTHER NORMAL PREGNANCY IN FIRST TRIMESTER: Primary | ICD-10-CM

## 2020-01-03 DIAGNOSIS — O34.219 PREVIOUS CESAREAN DELIVERY, ANTEPARTUM CONDITION OR COMPLICATION: ICD-10-CM

## 2020-01-03 LAB
MULTISTIX LOT#: NORMAL NUMERIC
PH, URINE: 6.5 (ref 4.5–8)
SPECIFIC GRAVITY: 1.01 (ref 1–1.03)
URINE-COLOR: YELLOW
UROBILINOGEN,SEMI-QN: 0.2 MG/DL (ref 0–1.9)

## 2020-01-03 PROCEDURE — 81002 URINALYSIS NONAUTO W/O SCOPE: CPT | Performed by: OBSTETRICS & GYNECOLOGY

## 2020-01-03 RX ORDER — LEVOTHYROXINE SODIUM 100 MCG
TABLET ORAL
COMMUNITY
Start: 2019-12-11 | End: 2020-07-24

## 2020-01-03 NOTE — PROGRESS NOTES
[facility], [facility]  Obstetrics and Gynecology  Prenatal Visit  Nicol Martinez MD    HPI Nicki Boast is a 37year old.o. [id number]  female with Patient's last menstrual period was 10/12/2019 (exact date).   and with an estimated date of delivery of: 7/18/2020, Genetic testing        Genetic testing        Genetic testing        GC DNA        Chlamydia DNA              24-28 Weeks     Test Value Reference Range Date Time    HCT        HGB        Platelets        GTT 1 Hr        Glucose Fasting        Glucose 1 Hr • Dysmenorrhea     Menses always painful   • Endometriosis     possibly   • History of chicken pox at age 1 and 1   • Human papilloma virus infection 2005    Colpo done.   All paps Negative since   • Other and unspecified hyperlipidemia 2005   • Other spe Stress: Not on file    Relationships      Social connections:        Talks on phone: Not on file        Gets together: Not on file        Attends Gnosticist service: Not on file        Active member of club or organization: Not on file        Attends fredi last menstrual period was 10/12/2019 (exact date).   and with an estimated date of delivery of: 7/18/2020, by Last Menstrual Period c/w 8 week US and current EGA of 11w6d  Hx of prior C/S and AMA    (Z34.81) Encounter for supervision of other normal pregnan

## 2020-01-05 LAB
C TRACH DNA SPEC QL NAA+PROBE: NEGATIVE
N GONORRHOEA DNA SPEC QL NAA+PROBE: NEGATIVE

## 2020-01-08 ENCOUNTER — TELEPHONE (OUTPATIENT)
Dept: OBGYN CLINIC | Facility: CLINIC | Age: 35
End: 2020-01-08

## 2020-01-08 NOTE — TELEPHONE ENCOUNTER
Incoming Fax received from [facility] with patient's Innatal test results.     Sample collection date: 1/2/2020  Report date: 1/7/2020    Results: Negative  No Aneuploidies detected (Chromosome 13,18,21)   Fetal Sex: Female      RN routing hardcopy of report

## 2020-02-11 ENCOUNTER — TELEPHONE (OUTPATIENT)
Dept: OBGYN CLINIC | Facility: CLINIC | Age: 35
End: 2020-02-11

## 2020-02-11 DIAGNOSIS — O09.522 AMA (ADVANCED MATERNAL AGE) MULTIGRAVIDA 35+, SECOND TRIMESTER: Primary | ICD-10-CM

## 2020-02-11 DIAGNOSIS — Z34.82 ENCOUNTER FOR SUPERVISION OF OTHER NORMAL PREGNANCY IN SECOND TRIMESTER: ICD-10-CM

## 2020-02-11 NOTE — TELEPHONE ENCOUNTER
Call received from [facility], pt has called to schedule appt but no order in system. Pt is AMA and is currently 17w3d gestation. Last seen for PN care on 1/3/2020. RN routing to on call provider to approve.

## 2020-03-02 ENCOUNTER — TELEPHONE (OUTPATIENT)
Dept: OBGYN CLINIC | Facility: CLINIC | Age: 35
End: 2020-03-02

## 2020-03-03 NOTE — TELEPHONE ENCOUNTER
tcb also sent a [place] Message regarding pt's questions. Pt to call if she has any other questions.

## 2020-03-04 ENCOUNTER — HOSPITAL ENCOUNTER (OUTPATIENT)
Dept: PERINATAL CARE | Facility: HOSPITAL | Age: 35
Discharge: HOME | End: 2020-03-04
Attending: OBSTETRICS & GYNECOLOGY
Payer: COMMERCIAL

## 2020-03-04 DIAGNOSIS — O09.522 AMA (ADVANCED MATERNAL AGE) MULTIGRAVIDA 35+, SECOND TRIMESTER: ICD-10-CM

## 2020-03-04 DIAGNOSIS — Z36.3 ENCOUNTER FOR ANTENATAL SCREENING FOR MALFORMATION USING ULTRASOUND: ICD-10-CM

## 2020-03-04 DIAGNOSIS — O09.522 AMA (ADVANCED MATERNAL AGE) MULTIGRAVIDA 35+, SECOND TRIMESTER: Primary | ICD-10-CM

## 2020-03-04 DIAGNOSIS — O43.122 VELAMENTOUS INSERTION OF UMBILICAL CORD IN SECOND TRIMESTER: ICD-10-CM

## 2020-03-04 DIAGNOSIS — Z82.79 FAMILY HISTORY OF CONGENITAL HEART DEFECT: ICD-10-CM

## 2020-03-04 PROCEDURE — 99213 OFFICE O/P EST LOW 20 MIN: CPT | Performed by: OBSTETRICS & GYNECOLOGY

## 2020-03-04 PROCEDURE — 76811 OB US DETAILED SNGL FETUS: CPT | Performed by: OBSTETRICS & GYNECOLOGY

## 2020-03-04 NOTE — PROGRESS NOTES
Reason for Consult:   Dear Dr. Sue Modi,    Thank you for requesting ultrasound evaluation and maternal fetal medicine consultation on Marla Melendez. As you are aware she is a 37year old female with a Lopes pregnancy at 22w2d.   A maternal-fetal medici Surgical History:   Procedure Laterality Date   •      •  SECTION N/A 2017    Performed by Uday Rojas MD at [facility] L+D OR   • COLPOSCOPY, CERVIX W/UPPER ADJACENT VAGINA; W/BIOPSY(S), CERVIX      Paps Negative for HPV since 32 weeks gestation for women 40 years and older) are also advised. Routine obstetric care is more than adequate to assess for gestational diabetes and preeclampsia; hence, no further significant alterations in obstetric care are advised.     Medical Complic are advised for women of advanced maternal age; testing should be initiated at 42 weeks for women 35-39 years and at 26 weeks for women 36 years and older.     Fetal Malformations    Cardiac malformations, clubfoot, and diaphragmatic hernia appear to occur Congenital heart disease (CHD) is the most common congenital disorder in newborns.    Prenatal identification and management of fetal cardiac abnormalities are important because congenital anomalies are the leading cause of infant death and congenital hea with suspicion of fetal myocarditis   ? Pregnancy conceived by assisted reproduction technology (ART)   ? CHD in first degree relative of fetus   ? First or second degree relative with disorder with Mendelian inheritance with CHD association   ? Fetal cardiac Pylelectasis absent. No hyperechogenic bowel. Echogenic intracardiac foci absent. Nasal bone present. Choroid plexus cyst absent. Summary of Ultrasound findings:   This is a [place] pregnancy    The fetal measurements are consistent with establishe

## 2020-03-06 ENCOUNTER — ROUTINE PRENATAL (OUTPATIENT)
Dept: OBGYN CLINIC | Facility: CLINIC | Age: 35
End: 2020-03-06
Payer: COMMERCIAL

## 2020-03-06 VITALS
SYSTOLIC BLOOD PRESSURE: 107 MMHG | BODY MASS INDEX: 26 KG/M2 | WEIGHT: 164 LBS | HEART RATE: 88 BPM | DIASTOLIC BLOOD PRESSURE: 66 MMHG

## 2020-03-06 DIAGNOSIS — Z34.82 ENCOUNTER FOR SUPERVISION OF OTHER NORMAL PREGNANCY IN SECOND TRIMESTER: Primary | ICD-10-CM

## 2020-03-06 PROCEDURE — 81002 URINALYSIS NONAUTO W/O SCOPE: CPT | Performed by: OBSTETRICS & GYNECOLOGY

## 2020-03-30 ENCOUNTER — PATIENT MESSAGE (OUTPATIENT)
Dept: OBGYN CLINIC | Facility: CLINIC | Age: 35
End: 2020-03-30

## 2020-03-30 ENCOUNTER — TELEPHONE (OUTPATIENT)
Dept: OBGYN CLINIC | Facility: CLINIC | Age: 35
End: 2020-03-30

## 2020-03-30 NOTE — TELEPHONE ENCOUNTER
Pt cancelled pn appt via Ayla stating \" I would like to cancel my appointment if it isn't necessary. I am feeling fine and the baby is moving a bunch. \"

## 2020-03-31 ENCOUNTER — TELEPHONE (OUTPATIENT)
Dept: OBGYN CLINIC | Facility: CLINIC | Age: 35
End: 2020-03-31

## 2020-04-01 ENCOUNTER — VIRTUAL PHONE E/M (OUTPATIENT)
Dept: OBGYN CLINIC | Facility: CLINIC | Age: 35
End: 2020-04-01
Payer: COMMERCIAL

## 2020-04-01 DIAGNOSIS — Z23 NEED FOR VACCINATION: ICD-10-CM

## 2020-04-01 DIAGNOSIS — O09.523 MULTIGRAVIDA OF ADVANCED MATERNAL AGE IN THIRD TRIMESTER: Primary | ICD-10-CM

## 2020-04-01 PROBLEM — O09.522 MULTIGRAVIDA OF ADVANCED MATERNAL AGE IN SECOND TRIMESTER: Status: ACTIVE | Noted: 2020-04-01

## 2020-04-01 PROBLEM — O43.122 VELAMENTOUS INSERTION OF UMBILICAL CORD IN SECOND TRIMESTER: Status: ACTIVE | Noted: 2020-04-01

## 2020-04-01 PROBLEM — O09.299 CURRENT SINGLETON PREGNANCY WITH HISTORY OF CONGENITAL HEART DISEASE IN PRIOR CHILD, ANTEPARTUM: Status: ACTIVE | Noted: 2020-04-01

## 2020-04-01 NOTE — TELEPHONE ENCOUNTER
From: Noreen Werner  To: Jama Mcdaniel MD, MD  Sent: 3/30/2020 12:28 PM CDT  Subject: Non-Urgent Medical Question    Hi - I have an appointment scheduled on Friday and I believe it is just a check in on weight, etc. If possible, I would like to cancel to

## 2020-04-01 NOTE — PROGRESS NOTES
Virtual/Telephone Check-In    [place] Aas verbally consents to a Virtual/Telephone Check-In service on 04/01/20. Patient understands and accepts financial responsibility for any deductible, co-insurance and/or co-pays associated witthis service.     [place] disability papers and drop at the   10. Discussed velamentous insertion of cord and described potential risks. Pt will discuss with Dr. Brenda Gaona at next visit and consider safest route for delivery.   11. RTC 2 wk with Brenda Gaona MD to be counseled a

## 2020-04-01 NOTE — TELEPHONE ENCOUNTER
I spoke with patient on the phone and reviewed her US report. We discussed velamentous insertion. Pt understands.

## 2020-04-21 ENCOUNTER — APPOINTMENT (OUTPATIENT)
Dept: LAB | Facility: HOSPITAL | Age: 35
End: 2020-04-21
Attending: ADVANCED PRACTICE MIDWIFE
Payer: COMMERCIAL

## 2020-04-21 PROCEDURE — 85027 COMPLETE CBC AUTOMATED: CPT | Performed by: ADVANCED PRACTICE MIDWIFE

## 2020-04-21 PROCEDURE — 36415 COLL VENOUS BLD VENIPUNCTURE: CPT | Performed by: ADVANCED PRACTICE MIDWIFE

## 2020-04-21 PROCEDURE — 82950 GLUCOSE TEST: CPT | Performed by: ADVANCED PRACTICE MIDWIFE

## 2020-04-30 ENCOUNTER — ROUTINE PRENATAL (OUTPATIENT)
Dept: OBGYN CLINIC | Facility: CLINIC | Age: 35
End: 2020-04-30
Payer: COMMERCIAL

## 2020-04-30 VITALS
WEIGHT: 167 LBS | SYSTOLIC BLOOD PRESSURE: 115 MMHG | DIASTOLIC BLOOD PRESSURE: 71 MMHG | BODY MASS INDEX: 27 KG/M2 | HEART RATE: 94 BPM

## 2020-04-30 DIAGNOSIS — O34.219 PREVIOUS CESAREAN DELIVERY, ANTEPARTUM CONDITION OR COMPLICATION: ICD-10-CM

## 2020-04-30 DIAGNOSIS — Z34.83 ENCOUNTER FOR SUPERVISION OF OTHER NORMAL PREGNANCY IN THIRD TRIMESTER: Primary | ICD-10-CM

## 2020-04-30 DIAGNOSIS — O43.129 VELAMENTOUS INSERTION OF UMBILICAL CORD, ANTEPARTUM: ICD-10-CM

## 2020-04-30 PROCEDURE — 81002 URINALYSIS NONAUTO W/O SCOPE: CPT | Performed by: OBSTETRICS & GYNECOLOGY

## 2020-04-30 RX ORDER — MELATONIN
325
COMMUNITY
End: 2020-07-24

## 2020-05-14 ENCOUNTER — VIRTUAL PHONE E/M (OUTPATIENT)
Dept: OBGYN CLINIC | Facility: CLINIC | Age: 35
End: 2020-05-14
Payer: COMMERCIAL

## 2020-05-14 DIAGNOSIS — E03.8 OTHER SPECIFIED HYPOTHYROIDISM: ICD-10-CM

## 2020-05-14 DIAGNOSIS — O34.219 PREVIOUS CESAREAN DELIVERY, ANTEPARTUM CONDITION OR COMPLICATION: Primary | ICD-10-CM

## 2020-05-14 DIAGNOSIS — O43.122 VELAMENTOUS INSERTION OF UMBILICAL CORD IN SECOND TRIMESTER: ICD-10-CM

## 2020-05-14 NOTE — PROGRESS NOTES
Virtual Telephone Check-In    Genevieve Villarreal verbally consents to a Virtual/Telephone Check-In visit on 05/14/20. Patient understands and accepts financial responsibility for any deductible, co-insurance and/or co-pays associated with this service.     Aidee

## 2020-05-15 NOTE — PROGRESS NOTES
Comfort Gonzales  Dear Dr. Sebas Ross,     Thank you for requesting ultrasound evaluation and maternal fetal medicine consultation on your patient Vickey Maxwell.   As you are aware she is a 38year old female [id number] with a Alfreda physician. Child with congenital heart defect  See [facility] note from 3/4/2020 for detailed review. A fetal echocardiogram has been advised but the patient did not have the study performed.     Velamentous cord insertion -  I discussed the meaning of velamen was 25 minutes.

## 2020-05-22 ENCOUNTER — HOSPITAL ENCOUNTER (OUTPATIENT)
Dept: PERINATAL CARE | Facility: HOSPITAL | Age: 35
Discharge: HOME | End: 2020-05-22
Attending: OBSTETRICS & GYNECOLOGY
Payer: COMMERCIAL

## 2020-05-22 VITALS
DIASTOLIC BLOOD PRESSURE: 63 MMHG | HEART RATE: 107 BPM | BODY MASS INDEX: 28 KG/M2 | SYSTOLIC BLOOD PRESSURE: 123 MMHG | WEIGHT: 173 LBS

## 2020-05-22 DIAGNOSIS — O09.523 AMA (ADVANCED MATERNAL AGE) MULTIGRAVIDA 35+, THIRD TRIMESTER: ICD-10-CM

## 2020-05-22 DIAGNOSIS — E03.8 OTHER SPECIFIED HYPOTHYROIDISM: ICD-10-CM

## 2020-05-22 DIAGNOSIS — O09.523 AMA (ADVANCED MATERNAL AGE) MULTIGRAVIDA 35+, THIRD TRIMESTER: Primary | ICD-10-CM

## 2020-05-22 DIAGNOSIS — O43.122 VELAMENTOUS INSERTION OF UMBILICAL CORD IN SECOND TRIMESTER: ICD-10-CM

## 2020-05-22 PROCEDURE — 76816 OB US FOLLOW-UP PER FETUS: CPT | Performed by: OBSTETRICS & GYNECOLOGY

## 2020-05-22 PROCEDURE — 76819 FETAL BIOPHYS PROFIL W/O NST: CPT | Performed by: OBSTETRICS & GYNECOLOGY

## 2020-05-22 PROCEDURE — 99214 OFFICE O/P EST MOD 30 MIN: CPT | Performed by: OBSTETRICS & GYNECOLOGY

## 2020-05-22 NOTE — ADDENDUM NOTE
Encounter addended by: Uriah Gutierrez on: 5/22/2020 10:52 AM   Actions taken: Visit diagnoses modified, Charge Capture section accepted

## 2020-05-28 ENCOUNTER — ROUTINE PRENATAL (OUTPATIENT)
Dept: OBGYN CLINIC | Facility: CLINIC | Age: 35
End: 2020-05-28
Payer: COMMERCIAL

## 2020-05-28 VITALS — WEIGHT: 173.63 LBS | SYSTOLIC BLOOD PRESSURE: 98 MMHG | DIASTOLIC BLOOD PRESSURE: 60 MMHG | BODY MASS INDEX: 28 KG/M2

## 2020-05-28 DIAGNOSIS — Z34.83 ENCOUNTER FOR SUPERVISION OF OTHER NORMAL PREGNANCY IN THIRD TRIMESTER: Primary | ICD-10-CM

## 2020-05-28 PROCEDURE — 81002 URINALYSIS NONAUTO W/O SCOPE: CPT | Performed by: OBSTETRICS & GYNECOLOGY

## 2020-06-12 ENCOUNTER — ROUTINE PRENATAL (OUTPATIENT)
Dept: OBGYN CLINIC | Facility: CLINIC | Age: 35
End: 2020-06-12
Payer: COMMERCIAL

## 2020-06-12 VITALS — WEIGHT: 174 LBS | SYSTOLIC BLOOD PRESSURE: 120 MMHG | DIASTOLIC BLOOD PRESSURE: 72 MMHG | BODY MASS INDEX: 28 KG/M2

## 2020-06-12 DIAGNOSIS — Z34.93 NORMAL PREGNANCY IN THIRD TRIMESTER: Primary | ICD-10-CM

## 2020-06-12 PROCEDURE — 81002 URINALYSIS NONAUTO W/O SCOPE: CPT | Performed by: OBSTETRICS & GYNECOLOGY

## 2020-06-23 ENCOUNTER — HOSPITAL ENCOUNTER (OUTPATIENT)
Dept: PERINATAL CARE | Facility: HOSPITAL | Age: 35
Discharge: HOME | End: 2020-06-23
Attending: OBSTETRICS & GYNECOLOGY
Payer: COMMERCIAL

## 2020-06-23 ENCOUNTER — TELEPHONE (OUTPATIENT)
Dept: OBGYN CLINIC | Facility: CLINIC | Age: 35
End: 2020-06-23

## 2020-06-23 DIAGNOSIS — O43.122 VELAMENTOUS INSERTION OF UMBILICAL CORD IN SECOND TRIMESTER: ICD-10-CM

## 2020-06-23 DIAGNOSIS — O28.8 NON-REACTIVE NST (NON-STRESS TEST): Primary | ICD-10-CM

## 2020-06-23 DIAGNOSIS — O09.522 MULTIGRAVIDA OF ADVANCED MATERNAL AGE IN SECOND TRIMESTER: ICD-10-CM

## 2020-06-23 DIAGNOSIS — O28.8 NON-REACTIVE NST (NON-STRESS TEST): ICD-10-CM

## 2020-06-23 DIAGNOSIS — O43.122 VELAMENTOUS INSERTION OF UMBILICAL CORD IN SECOND TRIMESTER: Primary | ICD-10-CM

## 2020-06-23 PROCEDURE — 59025 FETAL NON-STRESS TEST: CPT | Performed by: OBSTETRICS & GYNECOLOGY

## 2020-06-23 PROCEDURE — 76819 FETAL BIOPHYS PROFIL W/O NST: CPT | Performed by: OBSTETRICS & GYNECOLOGY

## 2020-06-23 NOTE — NST
Nonstress Test   Patient: Amanda Lundberg    Gestation: 36w3d    NST:   ama     Variability: Moderate           Accelerations: Yes           Decelerations: None            Baseline: 140 BPM           Uterine Irritability: No           Contractions: Irregular

## 2020-06-24 NOTE — PROGRESS NOTES
OB ULTRASOUND REPORT   See imaging tab for complete ultrasound report or in PACS    Fetal Heart Rate: Present 142 bpm  Fetal Presentation: Vertex  Amniotic fluid MVP: 17.5 cm  Placental Location: Posterior       BIOPHYSICAL PROFILE:  Movement:    2/2  To

## 2020-06-27 ENCOUNTER — ROUTINE PRENATAL (OUTPATIENT)
Dept: OBGYN CLINIC | Facility: CLINIC | Age: 35
End: 2020-06-27
Payer: COMMERCIAL

## 2020-06-27 VITALS — WEIGHT: 174 LBS | DIASTOLIC BLOOD PRESSURE: 70 MMHG | SYSTOLIC BLOOD PRESSURE: 112 MMHG | BODY MASS INDEX: 28 KG/M2

## 2020-06-27 DIAGNOSIS — O09.529 ANTEPARTUM MULTIGRAVIDA OF ADVANCED MATERNAL AGE: Primary | ICD-10-CM

## 2020-06-27 DIAGNOSIS — O43.122 VELAMENTOUS INSERTION OF UMBILICAL CORD IN SECOND TRIMESTER: ICD-10-CM

## 2020-06-27 DIAGNOSIS — Z34.93 NORMAL PREGNANCY IN THIRD TRIMESTER: ICD-10-CM

## 2020-06-27 DIAGNOSIS — O34.219 PREVIOUS CESAREAN DELIVERY, ANTEPARTUM CONDITION OR COMPLICATION: ICD-10-CM

## 2020-06-27 PROCEDURE — 81002 URINALYSIS NONAUTO W/O SCOPE: CPT | Performed by: OBSTETRICS & GYNECOLOGY

## 2020-06-27 NOTE — PROGRESS NOTES
[facility], [facility]  Obstetrics and Gynecology  Prenatal Visit  Nicol Martinez MD    HPI   Nicki Boast is a 38year old.o. [id number] 37w0d weeks. Here for routine prenatal visit and is without complaints.   Patient denies any regular uterine contractions, sponta umbilical cord in second trimester    Plan   Pt to continue planned  testing with MFM  Pt w/o  questions  Pt counseled on labor instructions.   She is to go to the hospital/L&D when she has strong contractions that last about a minute and occur

## 2020-06-30 ENCOUNTER — HOSPITAL ENCOUNTER (OUTPATIENT)
Dept: PERINATAL CARE | Facility: HOSPITAL | Age: 35
Discharge: HOME | End: 2020-06-30
Attending: OBSTETRICS & GYNECOLOGY
Payer: COMMERCIAL

## 2020-06-30 DIAGNOSIS — O09.522 MULTIGRAVIDA OF ADVANCED MATERNAL AGE IN SECOND TRIMESTER: ICD-10-CM

## 2020-06-30 DIAGNOSIS — O43.122 VELAMENTOUS INSERTION OF UMBILICAL CORD IN SECOND TRIMESTER: Primary | ICD-10-CM

## 2020-06-30 PROCEDURE — 59025 FETAL NON-STRESS TEST: CPT | Performed by: OBSTETRICS & GYNECOLOGY

## 2020-06-30 NOTE — NST
Nonstress Test   Patient: Kristin Valladares    Gestation: 37w3d    NST:ama       Variability: Moderate           Accelerations: Yes           Decelerations: None            Baseline: 140 BPM           Uterine Irritability: No           Contractions: Not present

## 2020-07-01 NOTE — TELEPHONE ENCOUNTER
Dr. Azalia Naidu,     Please sign off on form: FMLA - maternity leave    -Highlight the patient and hit \"Chart\" button. -In Chart Review, w/in the Encounter tab - click 1 time on the Telephone call encounter for 6/23/2020 Scroll down the telephone encounter.

## 2020-07-01 NOTE — ADDENDUM NOTE
Encounter addended by: Garfield Arrieta MD on: 6/30/2020 7:16 PM   Actions taken: Clinical Note Signed

## 2020-07-01 NOTE — ADDENDUM NOTE
Encounter addended by:  Alley Israel MD on: 6/30/2020 8:37 PM   Actions taken: Clinical Note Signed, Charge Capture section accepted

## 2020-07-02 ENCOUNTER — ROUTINE PRENATAL (OUTPATIENT)
Dept: OBGYN CLINIC | Facility: CLINIC | Age: 35
End: 2020-07-02
Payer: COMMERCIAL

## 2020-07-02 VITALS — WEIGHT: 178.5 LBS | BODY MASS INDEX: 29 KG/M2 | SYSTOLIC BLOOD PRESSURE: 118 MMHG | DIASTOLIC BLOOD PRESSURE: 84 MMHG

## 2020-07-02 DIAGNOSIS — Z34.83 ENCOUNTER FOR SUPERVISION OF OTHER NORMAL PREGNANCY IN THIRD TRIMESTER: Primary | ICD-10-CM

## 2020-07-02 LAB
APPEARANCE: CLEAR
MULTISTIX LOT#: 1044 NUMERIC
PH, URINE: 5 (ref 4.5–8)
SPECIFIC GRAVITY: 1.01 (ref 1–1.03)
URINE-COLOR: YELLOW
UROBILINOGEN,SEMI-QN: 0.2 MG/DL (ref 0–1.9)

## 2020-07-02 PROCEDURE — 81002 URINALYSIS NONAUTO W/O SCOPE: CPT | Performed by: OBSTETRICS & GYNECOLOGY

## 2020-07-07 ENCOUNTER — HOSPITAL ENCOUNTER (OUTPATIENT)
Dept: PERINATAL CARE | Facility: HOSPITAL | Age: 35
Discharge: HOME | End: 2020-07-07
Attending: OBSTETRICS & GYNECOLOGY
Payer: COMMERCIAL

## 2020-07-07 DIAGNOSIS — O09.522 MULTIGRAVIDA OF ADVANCED MATERNAL AGE IN SECOND TRIMESTER: Primary | ICD-10-CM

## 2020-07-07 PROCEDURE — 59025 FETAL NON-STRESS TEST: CPT | Performed by: OBSTETRICS & GYNECOLOGY

## 2020-07-07 NOTE — NST
Nonstress Test   Patient: Ernest Severin    Gestation: 38w3d    NST: ama       Variability: Moderate           Accelerations: Yes           Decelerations: None            Baseline: 145 BPM           Uterine Irritability: No           Contractions: Irregular

## 2020-07-07 NOTE — ADDENDUM NOTE
Encounter addended by: Solo Slater MD on: 7/7/2020 5:49 PM   Actions taken: Clinical Note Signed, Charge Capture section accepted

## 2020-07-09 ENCOUNTER — ANESTHESIA EVENT (OUTPATIENT)
Dept: OBGYN UNIT | Facility: HOSPITAL | Age: 35
End: 2020-07-09
Payer: COMMERCIAL

## 2020-07-09 ENCOUNTER — HOSPITAL ENCOUNTER (INPATIENT)
Facility: HOSPITAL | Age: 35
LOS: 3 days | Discharge: HOME | End: 2020-07-12
Attending: OBSTETRICS & GYNECOLOGY | Admitting: OBSTETRICS & GYNECOLOGY
Payer: COMMERCIAL

## 2020-07-09 ENCOUNTER — ANESTHESIA (OUTPATIENT)
Dept: OBGYN UNIT | Facility: HOSPITAL | Age: 35
End: 2020-07-09
Payer: COMMERCIAL

## 2020-07-09 PROBLEM — Z34.90 PREGNANCY: Status: ACTIVE | Noted: 2020-07-09

## 2020-07-09 LAB
ANTIBODY SCREEN: NEGATIVE
BASOPHILS # BLD AUTO: 0.04 X10(3) UL (ref 0–0.2)
BASOPHILS NFR BLD AUTO: 0.3 %
DEPRECATED RDW RBC AUTO: 45.5 FL (ref 35.1–46.3)
EOSINOPHIL # BLD AUTO: 0.06 X10(3) UL (ref 0–0.7)
EOSINOPHIL NFR BLD AUTO: 0.5 %
ERYTHROCYTE [DISTWIDTH] IN BLOOD BY AUTOMATED COUNT: 13.4 % (ref 11–15)
HCT VFR BLD AUTO: 34.8 % (ref 35–48)
HGB BLD-MCNC: 12 G/DL (ref 12–16)
HIV 1+2 AB+HIV1 P24 AG SERPL QL IA: NONREACTIVE
IMM GRANULOCYTES # BLD AUTO: 0.1 X10(3) UL (ref 0–1)
IMM GRANULOCYTES NFR BLD: 0.8 %
LYMPHOCYTES # BLD AUTO: 1.96 X10(3) UL (ref 1–4)
LYMPHOCYTES NFR BLD AUTO: 16.3 %
MCH RBC QN AUTO: 32.2 PG (ref 26–34)
MCHC RBC AUTO-ENTMCNC: 34.5 G/DL (ref 31–37)
MCV RBC AUTO: 93.3 FL (ref 80–100)
MONOCYTES # BLD AUTO: 0.77 X10(3) UL (ref 0.1–1)
MONOCYTES NFR BLD AUTO: 6.4 %
NEUTROPHILS # BLD AUTO: 9.07 X10 (3) UL (ref 1.5–7.7)
NEUTROPHILS # BLD AUTO: 9.07 X10(3) UL (ref 1.5–7.7)
NEUTROPHILS NFR BLD AUTO: 75.7 %
PLATELET # BLD AUTO: 297 10(3)UL (ref 150–450)
RBC # BLD AUTO: 3.73 X10(6)UL (ref 3.8–5.3)
RH BLOOD TYPE: POSITIVE
RUPTURE OF MEMBRANE (ROM): POSITIVE
SARS-COV-2 RNA RESP QL NAA+PROBE: NOT DETECTED
WBC # BLD AUTO: 12 X10(3) UL (ref 4–11)

## 2020-07-09 PROCEDURE — 58611 LIGATE OVIDUCT(S) ADD-ON: CPT | Performed by: OBSTETRICS & GYNECOLOGY

## 2020-07-09 PROCEDURE — 59514 CESAREAN DELIVERY ONLY: CPT | Performed by: OBSTETRICS & GYNECOLOGY

## 2020-07-09 PROCEDURE — 59510 CESAREAN DELIVERY: CPT | Performed by: OBSTETRICS & GYNECOLOGY

## 2020-07-09 PROCEDURE — 0UT70ZZ RESECTION OF BILATERAL FALLOPIAN TUBES, OPEN APPROACH: ICD-10-PCS | Performed by: OBSTETRICS & GYNECOLOGY

## 2020-07-09 RX ORDER — DIPHENHYDRAMINE HCL 25 MG
25 CAPSULE ORAL EVERY 4 HOURS PRN
Status: ACTIVE | OUTPATIENT
Start: 2020-07-09 | End: 2020-07-10

## 2020-07-09 RX ORDER — SIMETHICONE 80 MG
80 TABLET,CHEWABLE ORAL 3 TIMES DAILY PRN
Status: DISCONTINUED | OUTPATIENT
Start: 2020-07-09 | End: 2020-07-12

## 2020-07-09 RX ORDER — DOCUSATE SODIUM 100 MG/1
100 CAPSULE, LIQUID FILLED ORAL
Status: DISCONTINUED | OUTPATIENT
Start: 2020-07-09 | End: 2020-07-12

## 2020-07-09 RX ORDER — SODIUM CHLORIDE, SODIUM LACTATE, POTASSIUM CHLORIDE, CALCIUM CHLORIDE 600; 310; 30; 20 MG/100ML; MG/100ML; MG/100ML; MG/100ML
INJECTION, SOLUTION INTRAVENOUS CONTINUOUS
Status: DISCONTINUED | OUTPATIENT
Start: 2020-07-09 | End: 2020-07-09 | Stop reason: HOSPADM

## 2020-07-09 RX ORDER — PROCHLORPERAZINE EDISYLATE 5 MG/ML
5 INJECTION INTRAMUSCULAR; INTRAVENOUS ONCE AS NEEDED
Status: ACTIVE | OUTPATIENT
Start: 2020-07-09 | End: 2020-07-09

## 2020-07-09 RX ORDER — DIPHENHYDRAMINE HYDROCHLORIDE 50 MG/ML
12.5 INJECTION INTRAMUSCULAR; INTRAVENOUS EVERY 4 HOURS PRN
Status: ACTIVE | OUTPATIENT
Start: 2020-07-09 | End: 2020-07-10

## 2020-07-09 RX ORDER — HYDROMORPHONE HYDROCHLORIDE 1 MG/ML
0.4 INJECTION, SOLUTION INTRAMUSCULAR; INTRAVENOUS; SUBCUTANEOUS
Status: ACTIVE | OUTPATIENT
Start: 2020-07-09 | End: 2020-07-10

## 2020-07-09 RX ORDER — METOCLOPRAMIDE 10 MG/1
10 TABLET ORAL ONCE
Status: COMPLETED | OUTPATIENT
Start: 2020-07-09 | End: 2020-07-09

## 2020-07-09 RX ORDER — HYDROCODONE BITARTRATE AND ACETAMINOPHEN 7.5; 325 MG/1; MG/1
2 TABLET ORAL EVERY 6 HOURS PRN
Status: ACTIVE | OUTPATIENT
Start: 2020-07-09 | End: 2020-07-10

## 2020-07-09 RX ORDER — ONDANSETRON 2 MG/ML
4 INJECTION INTRAMUSCULAR; INTRAVENOUS EVERY 6 HOURS PRN
Status: DISCONTINUED | OUTPATIENT
Start: 2020-07-09 | End: 2020-07-09 | Stop reason: HOSPADM

## 2020-07-09 RX ORDER — BISACODYL 10 MG
10 SUPPOSITORY, RECTAL RECTAL
Status: DISCONTINUED | OUTPATIENT
Start: 2020-07-09 | End: 2020-07-12

## 2020-07-09 RX ORDER — ACETAMINOPHEN 325 MG/1
650 TABLET ORAL EVERY 6 HOURS PRN
Status: ACTIVE | OUTPATIENT
Start: 2020-07-09 | End: 2020-07-10

## 2020-07-09 RX ORDER — PHENYLEPHRINE HCL 10 MG/ML
VIAL (ML) INJECTION AS NEEDED
Status: DISCONTINUED | OUTPATIENT
Start: 2020-07-09 | End: 2020-07-09 | Stop reason: SURG

## 2020-07-09 RX ORDER — ONDANSETRON 2 MG/ML
4 INJECTION INTRAMUSCULAR; INTRAVENOUS ONCE AS NEEDED
Status: ACTIVE | OUTPATIENT
Start: 2020-07-09 | End: 2020-07-09

## 2020-07-09 RX ORDER — DIPHENHYDRAMINE HYDROCHLORIDE 50 MG/ML
25 INJECTION INTRAMUSCULAR; INTRAVENOUS ONCE AS NEEDED
Status: ACTIVE | OUTPATIENT
Start: 2020-07-09 | End: 2020-07-09

## 2020-07-09 RX ORDER — TRISODIUM CITRATE DIHYDRATE AND CITRIC ACID MONOHYDRATE 500; 334 MG/5ML; MG/5ML
30 SOLUTION ORAL ONCE
Status: COMPLETED | OUTPATIENT
Start: 2020-07-09 | End: 2020-07-09

## 2020-07-09 RX ORDER — HYDROCODONE BITARTRATE AND ACETAMINOPHEN 7.5; 325 MG/1; MG/1
1 TABLET ORAL EVERY 6 HOURS PRN
Status: ACTIVE | OUTPATIENT
Start: 2020-07-09 | End: 2020-07-10

## 2020-07-09 RX ORDER — MORPHINE SULFATE 1 MG/ML
INJECTION, SOLUTION EPIDURAL; INTRATHECAL; INTRAVENOUS AS NEEDED
Status: DISCONTINUED | OUTPATIENT
Start: 2020-07-09 | End: 2020-07-09 | Stop reason: SURG

## 2020-07-09 RX ORDER — CEFAZOLIN SODIUM/WATER 2 G/20 ML
2 SYRINGE (ML) INTRAVENOUS ONCE
Status: COMPLETED | OUTPATIENT
Start: 2020-07-09 | End: 2020-07-09

## 2020-07-09 RX ORDER — HYDROCODONE BITARTRATE AND ACETAMINOPHEN 5; 325 MG/1; MG/1
2 TABLET ORAL EVERY 6 HOURS PRN
Status: DISCONTINUED | OUTPATIENT
Start: 2020-07-09 | End: 2020-07-12

## 2020-07-09 RX ORDER — FAMOTIDINE 20 MG/1
20 TABLET ORAL ONCE
Status: COMPLETED | OUTPATIENT
Start: 2020-07-09 | End: 2020-07-09

## 2020-07-09 RX ORDER — BUPIVACAINE HYDROCHLORIDE 7.5 MG/ML
INJECTION, SOLUTION INTRASPINAL AS NEEDED
Status: DISCONTINUED | OUTPATIENT
Start: 2020-07-09 | End: 2020-07-09 | Stop reason: SURG

## 2020-07-09 RX ORDER — SODIUM PHOSPHATE, DIBASIC AND SODIUM PHOSPHATE, MONOBASIC 7; 19 G/133ML; G/133ML
1 ENEMA RECTAL ONCE AS NEEDED
Status: DISCONTINUED | OUTPATIENT
Start: 2020-07-09 | End: 2020-07-12

## 2020-07-09 RX ORDER — ACETAMINOPHEN 325 MG/1
650 TABLET ORAL EVERY 6 HOURS PRN
Status: DISCONTINUED | OUTPATIENT
Start: 2020-07-09 | End: 2020-07-12

## 2020-07-09 RX ORDER — IBUPROFEN 600 MG/1
600 TABLET ORAL EVERY 6 HOURS PRN
Status: DISCONTINUED | OUTPATIENT
Start: 2020-07-09 | End: 2020-07-12

## 2020-07-09 RX ORDER — NALOXONE HYDROCHLORIDE 0.4 MG/ML
0.08 INJECTION, SOLUTION INTRAMUSCULAR; INTRAVENOUS; SUBCUTANEOUS
Status: ACTIVE | OUTPATIENT
Start: 2020-07-09 | End: 2020-07-10

## 2020-07-09 RX ORDER — HYDROCODONE BITARTRATE AND ACETAMINOPHEN 5; 325 MG/1; MG/1
1 TABLET ORAL EVERY 6 HOURS PRN
Status: DISCONTINUED | OUTPATIENT
Start: 2020-07-09 | End: 2020-07-12

## 2020-07-09 RX ORDER — DEXTROSE, SODIUM CHLORIDE, SODIUM LACTATE, POTASSIUM CHLORIDE, AND CALCIUM CHLORIDE 5; .6; .31; .03; .02 G/100ML; G/100ML; G/100ML; G/100ML; G/100ML
INJECTION, SOLUTION INTRAVENOUS CONTINUOUS
Status: DISCONTINUED | OUTPATIENT
Start: 2020-07-09 | End: 2020-07-12

## 2020-07-09 RX ORDER — DEXAMETHASONE SODIUM PHOSPHATE 4 MG/ML
VIAL (ML) INJECTION AS NEEDED
Status: DISCONTINUED | OUTPATIENT
Start: 2020-07-09 | End: 2020-07-09 | Stop reason: SURG

## 2020-07-09 RX ORDER — CHOLECALCIFEROL (VITAMIN D3) 25 MCG
1 TABLET,CHEWABLE ORAL DAILY
Status: DISCONTINUED | OUTPATIENT
Start: 2020-07-09 | End: 2020-07-12

## 2020-07-09 RX ORDER — HYDROMORPHONE HYDROCHLORIDE 1 MG/ML
0.6 INJECTION, SOLUTION INTRAMUSCULAR; INTRAVENOUS; SUBCUTANEOUS
Status: ACTIVE | OUTPATIENT
Start: 2020-07-09 | End: 2020-07-10

## 2020-07-09 RX ORDER — KETOROLAC TROMETHAMINE 30 MG/ML
30 INJECTION, SOLUTION INTRAMUSCULAR; INTRAVENOUS ONCE AS NEEDED
Status: COMPLETED | OUTPATIENT
Start: 2020-07-09 | End: 2020-07-09

## 2020-07-09 RX ORDER — SODIUM CHLORIDE, SODIUM LACTATE, POTASSIUM CHLORIDE, CALCIUM CHLORIDE 600; 310; 30; 20 MG/100ML; MG/100ML; MG/100ML; MG/100ML
INJECTION, SOLUTION INTRAVENOUS CONTINUOUS
Status: DISCONTINUED | OUTPATIENT
Start: 2020-07-09 | End: 2020-07-12

## 2020-07-09 RX ORDER — ONDANSETRON 2 MG/ML
4 INJECTION INTRAMUSCULAR; INTRAVENOUS EVERY 6 HOURS PRN
Status: DISCONTINUED | OUTPATIENT
Start: 2020-07-09 | End: 2020-07-12

## 2020-07-09 RX ORDER — METOCLOPRAMIDE HYDROCHLORIDE 5 MG/ML
10 INJECTION INTRAMUSCULAR; INTRAVENOUS ONCE
Status: COMPLETED | OUTPATIENT
Start: 2020-07-09 | End: 2020-07-09

## 2020-07-09 RX ORDER — HALOPERIDOL 5 MG/ML
0.5 INJECTION INTRAMUSCULAR ONCE AS NEEDED
Status: ACTIVE | OUTPATIENT
Start: 2020-07-09 | End: 2020-07-09

## 2020-07-09 RX ORDER — AMMONIA INHALANTS 0.04 G/.3ML
0.3 INHALANT RESPIRATORY (INHALATION) AS NEEDED
Status: DISCONTINUED | OUTPATIENT
Start: 2020-07-09 | End: 2020-07-12

## 2020-07-09 RX ORDER — FAMOTIDINE 10 MG/ML
20 INJECTION, SOLUTION INTRAVENOUS ONCE
Status: COMPLETED | OUTPATIENT
Start: 2020-07-09 | End: 2020-07-09

## 2020-07-09 RX ORDER — POLYETHYLENE GLYCOL 3350 17 G/17G
17 POWDER, FOR SOLUTION ORAL DAILY PRN
Status: DISCONTINUED | OUTPATIENT
Start: 2020-07-09 | End: 2020-07-12

## 2020-07-09 RX ADMIN — PHENYLEPHRINE HCL 100 MCG: 10 MG/ML VIAL (ML) INJECTION at 12:58:00

## 2020-07-09 RX ADMIN — ONDANSETRON 4 MG: 2 INJECTION INTRAMUSCULAR; INTRAVENOUS at 12:55:00

## 2020-07-09 RX ADMIN — BUPIVACAINE HYDROCHLORIDE 1.6 ML: 7.5 INJECTION, SOLUTION INTRASPINAL at 12:52:00

## 2020-07-09 RX ADMIN — SODIUM CHLORIDE, SODIUM LACTATE, POTASSIUM CHLORIDE, CALCIUM CHLORIDE: 600; 310; 30; 20 INJECTION, SOLUTION INTRAVENOUS at 13:44:00

## 2020-07-09 RX ADMIN — PHENYLEPHRINE HCL 100 MCG: 10 MG/ML VIAL (ML) INJECTION at 13:27:00

## 2020-07-09 RX ADMIN — DEXAMETHASONE SODIUM PHOSPHATE 4 MG: 4 MG/ML VIAL (ML) INJECTION at 12:55:00

## 2020-07-09 RX ADMIN — CEFAZOLIN SODIUM/WATER 2 G: 2 G/20 ML SYRINGE (ML) INTRAVENOUS at 12:55:00

## 2020-07-09 RX ADMIN — MORPHINE SULFATE 0.2 MG: 1 INJECTION, SOLUTION EPIDURAL; INTRATHECAL; INTRAVENOUS at 12:52:00

## 2020-07-09 NOTE — PROGRESS NOTES
Pt moved to 56 per ambulatory with  and all belongings for admit for repeat  section with bilateral salpingectomy. Pt in stable condition.

## 2020-07-09 NOTE — H&P
[place] [facility] - [facility]    History & Physical    Naresh Newer Patient Status:  Observation    3/26/1985 MRN [id number]   Location [address]  Attending Lexis Obrien MD   Hosp Day # 1 [facility] AND EDMUND RUSS     Date of Admi since   • OTHER SURGICAL HISTORY  2008    [place] Teeth removed     Family History:   Family History   Problem Relation Age of Onset   • Hypertension Father    • Lipids Father    • Heart Disorder Mother    • Lipids Mother    • Heart Disorder Maternal Grandm 11/23/2016    ABO A 07/09/2020    RH Positive 07/09/2020    WBC 12.0 (H) 07/09/2020    HGB 12.0 07/09/2020    HCT 34.8 (L) 07/09/2020    .0 07/09/2020    AST 19 05/05/2017    ALT 15 05/05/2017    T4F 1.0 12/23/2019    TSH 2.110 12/23/2019       Lab detail. All questions answered, all appropriate consents will be signed at the Hospital. Admission is planned for today. Continue present management. Kojo VAUGHN  [facility]  7/9/2020  12:09 PM

## 2020-07-09 NOTE — L&D DELIVERY NOTE
[facility]     Section Delivery Note    Edwena Night Patient Status:  Observation    3/26/1985 MRN [id number]   Location [address] Attending Olga Arauz MD   [facility] Day # 1 PCP Edilia 4052

## 2020-07-09 NOTE — PLAN OF CARE
Problem: PAIN - ADULT  Goal: Verbalizes/displays adequate comfort level or patient's stated pain goal  Description  INTERVENTIONS:  - Encourage pt to monitor pain and request assistance  - Assess pain using appropriate pain scale  - Administer analgesics Problem: GASTROINTESTINAL - ADULT  Goal: Minimal or absence of nausea and vomiting  Description  INTERVENTIONS:  - Maintain adequate hydration with IV or PO as ordered and tolerated  - Nasogastric tube to low intermittent suction as ordered  - Evaluate e monitor vital signs and lab values. - Assess fundus and lochia. - Provide ice/sitz baths for perineum discomfort. - Monitor healing of incision/episiotomy/laceration, and assess for signs and symptoms of infection and hematoma.   - Assess bladder functio and storage of breast milk. Provide pumping equipment/supplies, instructions and assistance, as needed. - Encourage rooming-in and breast feeding on demand.  - Encourage skin-to-skin contact. - Provide LC support as needed.   - Assess for and manage engor Monitor I&O, WT and lab values  - Obtain nutritional consult as needed  - Evaluate psychosocial factors contributing to over-consumption  Outcome: Completed   Mom doing well throughout the day. Had Toradol IV prior to transfer to postpartum.  Vitals remain

## 2020-07-09 NOTE — ANESTHESIA PROCEDURE NOTES
Spinal Block  Performed by: Hemanth Villarreal MD  Authorized by: Hemanth Villarreal MD       General Information and Staff    Start Time:   Anesthesiologist: Hemanth Villarreal MD  Performed by:   Anesthesiologist  Patient Location:  OB  Preanesthetic Checklist: kasia

## 2020-07-09 NOTE — ANESTHESIA POSTPROCEDURE EVALUATION
Patient: Michelle Regalado    Procedure Summary     Date:  20 Room / Location:  [facility] L+D OR  [facility] L+D OR    Anesthesia Start:  1244 Anesthesia Stop:  1400    Procedures:        SECTION (N/A Abdomen)      BILATERAL SALPINGECTOMY (Bilateral Abdomen)

## 2020-07-09 NOTE — PROGRESS NOTES
Patient transferred to mother/baby room 353 per cart in stable condition with baby and personal belongings. Accompanied by  and staff. Report given to REN SCHAEFFER RN.

## 2020-07-09 NOTE — ANESTHESIA PREPROCEDURE EVALUATION
Anesthesia PreOp Note    HPI:     Ernest Severin is a 38year old female who presents for preoperative consultation requested by:  Keon Lewis MD    Date of Surgery: 2020    Procedure(s):   SECTION  BILATERAL SALPINGECTOMY  Indication: Repe time  SYNTHROID 100 MCG Oral Tab, TAKE 1 TABLET BY MOUTH TWICE A WEEK, Disp: , Rfl: , 7/9/2020 at Unknown time  SYNTHROID 88 MCG Oral Tab, Take 88 mcg by mouth once daily. , Disp: , Rfl: 2, 7/9/2020 at Unknown time  PRENATAL MULTI +DHA (PNV WITH DHA) 27-0.8 status:       Spouse name: Giuseppe Gregory      Number of children: 1      Years of education: 18      Highest education level: Not on file    Occupational History      Occupation: [employer]: Full time    Devan Dennison Component Value Date    WBC 12.0 (H) 07/09/2020    RBC 3.73 (L) 07/09/2020    HGB 12.0 07/09/2020    HCT 34.8 (L) 07/09/2020    MCV 93.3 07/09/2020    MCH 32.2 07/09/2020    MCHC 34.5 07/09/2020    RDW 13.4 07/09/2020    .0 07/09/2020

## 2020-07-09 NOTE — PROGRESS NOTES
Pt is a 38year old female admitted to [address] [place] [address]. Patient presents with:  R/o Rom: 0500-clear, Denies VB, +FM     Pt is [id number] 38w5d intra-uterine pregnancy. History obtained, consents signed. Oriented to room, staff, and plan of care.

## 2020-07-10 LAB
BASOPHILS # BLD AUTO: 0.03 X10(3) UL (ref 0–0.2)
BASOPHILS NFR BLD AUTO: 0.2 %
DEPRECATED RDW RBC AUTO: 45.8 FL (ref 35.1–46.3)
EOSINOPHIL # BLD AUTO: 0.03 X10(3) UL (ref 0–0.7)
EOSINOPHIL NFR BLD AUTO: 0.2 %
ERYTHROCYTE [DISTWIDTH] IN BLOOD BY AUTOMATED COUNT: 13.3 % (ref 11–15)
HCT VFR BLD AUTO: 29 % (ref 35–48)
HGB BLD-MCNC: 9.9 G/DL (ref 12–16)
IMM GRANULOCYTES # BLD AUTO: 0.09 X10(3) UL (ref 0–1)
IMM GRANULOCYTES NFR BLD: 0.5 %
LYMPHOCYTES # BLD AUTO: 2.61 X10(3) UL (ref 1–4)
LYMPHOCYTES NFR BLD AUTO: 15.5 %
MCH RBC QN AUTO: 32.1 PG (ref 26–34)
MCHC RBC AUTO-ENTMCNC: 34.1 G/DL (ref 31–37)
MCV RBC AUTO: 94.2 FL (ref 80–100)
MONOCYTES # BLD AUTO: 1.28 X10(3) UL (ref 0.1–1)
MONOCYTES NFR BLD AUTO: 7.6 %
NEUTROPHILS # BLD AUTO: 12.84 X10 (3) UL (ref 1.5–7.7)
NEUTROPHILS # BLD AUTO: 12.84 X10(3) UL (ref 1.5–7.7)
NEUTROPHILS NFR BLD AUTO: 76 %
PLATELET # BLD AUTO: 274 10(3)UL (ref 150–450)
RBC # BLD AUTO: 3.08 X10(6)UL (ref 3.8–5.3)
T PALLIDUM AB SER QL: NEGATIVE
WBC # BLD AUTO: 16.9 X10(3) UL (ref 4–11)

## 2020-07-10 NOTE — PROGRESS NOTES
POD 1    Pt is s/p RLTCS due to PROM/remote from delivery/prior c/s    Pt has some mild right shoulder discomfort, positional and with deep breathing and motion, likely gas related, not constant and only certain positions in bed,pt wants to ambulate now,

## 2020-07-10 NOTE — OPERATIVE REPORT
[facility]    PATIENT'S NAME: Xena Parisi   ATTENDING PHYSICIAN: Bennett Felix MD   OPERATING PHYSICIAN: Hodan Felix MD   PATIENT ACCOUNT#:   [de-identified]    LOCATION:  [address] [place] [address] #:   [id number]       DATE OF BIRTH: understanding of all the above and all questions were answered. FINDINGS:  Delivered a viable female infant weighing 4030 g, Apgars 8 at one minute and 9 at five minutes.       CONDITION:  Patient tolerated the procedure well and was taken to PAR in good and tubes were exteriorized and were grossly normal.  After it was assured all clots and membranes were removed from the uterine cavity, uterine incision was closed with 0 Vicryl suture in a running interlocking stitch, followed by 0 Vicryl suture running

## 2020-07-10 NOTE — LACTATION NOTE
LACTATION NOTE - MOTHER      Evaluation Type: Inpatient         Maternal history  Maternal history: Caesarean section; Hypothyroid  Other/comment: history of HPV    Breastfeeding goal  Breastfeeding goal: To maintain breast milk feeding per patient goal

## 2020-07-10 NOTE — PLAN OF CARE
Problem: Patient Centered Care  Goal: Patient preferences are identified and integrated in the patient's plan of care  Description  Interventions:  - What would you like us to know as we care for you? It's a girl.  Pt requests no bow hats for infant  - Pr activity  - Obtain nutritional consult as needed  - Establish a toileting routine/schedule  - Consider collaborating with pharmacy to review patient's medication profile  Outcome: Progressing  Goal: Maintains adequate nutritional intake (undernourished)  D exposure to communicable diseases history. Outcome: Progressing  Goal: Optimize infant feeding at the breast  Description  INTERVENTIONS:  - Initiate breast feeding within first hour after birth.    - Monitor effectiveness of current breast feeding efforts course  Description  INTERVENTIONS:  - Assess for and manage engorgement. - Instruct on breast care. - Provide comfort measures.   Outcome: Progressing  Goal: Appropriate maternal -  bonding  Description  INTERVENTIONS:  - Assess caregiver-

## 2020-07-10 NOTE — PROGRESS NOTES
Per patient states having right upper shoulder pain in her back when breathing in deep. Given simethicone earlier as pain sounds like it is d/t gas.  Patient has not gotten up out of bed since surgery but has been sitting up in bed with infant and moving le

## 2020-07-10 NOTE — PLAN OF CARE
Problem: GASTROINTESTINAL - ADULT  Goal: Minimal or absence of nausea and vomiting  Description  INTERVENTIONS:  - Maintain adequate hydration with IV or PO as ordered and tolerated  - Nasogastric tube to low intermittent suction as ordered  - Evaluate e smacking, sucking fingers/hand) versus late cue of crying.  - Discuss/demonstrate breast feeding aids (e.g., infant sling, nursing footstool/pillows, and breast pumps).   - Encourage mother/other family members to express feelings/concerns, and actively [address] patient while encouraging question asking and attentive listening done by RN. SCDS on. Tolerating clear liquid diet, some mild nausea no vomiting.

## 2020-07-11 NOTE — PROGRESS NOTES
[place] [facility] - [facility]    OB/GYNE Progress Note      Deya Sanchez Patient Status:  Inpatient    3/26/1985 MRN [id number]   Location [facility] 3SE Attending Les Goel MD   Hosp Day # 2 [facility] AND EDMUND R[facility]       Subjective   C/o pain a

## 2020-07-12 VITALS
SYSTOLIC BLOOD PRESSURE: 119 MMHG | HEIGHT: 65.98 IN | OXYGEN SATURATION: 98 % | RESPIRATION RATE: 17 BRPM | DIASTOLIC BLOOD PRESSURE: 65 MMHG | WEIGHT: 178 LBS | HEART RATE: 83 BPM | TEMPERATURE: 97 F | BODY MASS INDEX: 28.61 KG/M2

## 2020-07-12 RX ORDER — IBUPROFEN 600 MG/1
600 TABLET ORAL EVERY 6 HOURS PRN
Qty: 30 TABLET | Refills: 0 | Status: SHIPPED | OUTPATIENT
Start: 2020-07-12 | End: 2020-08-21

## 2020-07-12 RX ORDER — HYDROCODONE BITARTRATE AND ACETAMINOPHEN 5; 325 MG/1; MG/1
1 TABLET ORAL EVERY 6 HOURS PRN
Qty: 15 TABLET | Refills: 0 | Status: SHIPPED | OUTPATIENT
Start: 2020-07-12 | End: 2020-07-24

## 2020-07-12 NOTE — PLAN OF CARE
Sat with patient to review plan of care. Discussed analgesic options and answered all questions. VSS. Breastfeeding on demand. Breastfeeding successfully. Voiding independently. Lochia is WNL. Uterus is firm.      Problem: Patient Centered Care  Goal: Jessicae function  Description  INTERVENTIONS:  - Assess bowel function  - Maintain adequate hydration with IV or PO as ordered and tolerated  - Evaluate effectiveness of GI medications  - Encourage mobilization and activity  - Obtain nutritional consult as needed resources as needed. - Utilize standard precautions and use personal protective equipment as indicated. Ensure aseptic care of all intravenous lines and invasive tubes/drains.  - Obtain immunization and exposure to communicable diseases history.   Outcome: (breast pumping). - Provide pumping equipment/supplies, instructions, and assistance until it is safe to breastfeed infant.   Outcome: Progressing  Goal: Experiences normal breast weaning course  Description  INTERVENTIONS:  - Assess for and manage engorg

## 2020-07-12 NOTE — PROGRESS NOTES
Discharge instructions taught and understood. ID bands verified. When to call primary healthcare provider was discussed and patient states understanding. All questions answered.  Signs and symptoms of postpartum depression, hemorrhage, infection, and pre-ec

## 2020-07-12 NOTE — DISCHARGE SUMMARY
[place] [facility] - [facility]    Discharge Summary    Amanda Lundberg Patient Status:  Inpatient    3/26/1985 MRN [id number]   Location [facility] 3SE Attending Aria Martin MD   [facility] Day # 3 PCP Anshu Lemus     Date of Admission: 2020   D discuss with provider. Discharge Diet: General diet    Discharge Activity: As tolerated    Follow up: Follow-up Information     Call [facility].     Specialty:  GE PEDIATRICS  Why:  As needed  Contact information:

## 2020-07-15 ENCOUNTER — TELEPHONE (OUTPATIENT)
Dept: OBGYN UNIT | Facility: HOSPITAL | Age: 35
End: 2020-07-15

## 2020-07-24 ENCOUNTER — POSTPARTUM (OUTPATIENT)
Dept: OBGYN CLINIC | Facility: CLINIC | Age: 35
End: 2020-07-24
Payer: COMMERCIAL

## 2020-07-24 VITALS — DIASTOLIC BLOOD PRESSURE: 60 MMHG | SYSTOLIC BLOOD PRESSURE: 110 MMHG | WEIGHT: 158 LBS | BODY MASS INDEX: 26 KG/M2

## 2020-07-24 PROCEDURE — 1111F DSCHRG MED/CURRENT MED MERGE: CPT | Performed by: OBSTETRICS & GYNECOLOGY

## 2020-07-24 PROCEDURE — 3074F SYST BP LT 130 MM HG: CPT | Performed by: OBSTETRICS & GYNECOLOGY

## 2020-07-24 PROCEDURE — 3078F DIAST BP <80 MM HG: CPT | Performed by: OBSTETRICS & GYNECOLOGY

## 2020-07-24 NOTE — PROGRESS NOTES
HPI:   Ernest Severin is a 38year old female who presents for a pp visit. Wt Readings from Last 6 Encounters:  07/24/20 : 158 lb (71.7 kg)  07/09/20 : 178 lb (80.7 kg)  07/02/20 : 178 lb 8 oz (81 kg)  06/27/20 : 174 lb (78.9 kg)  06/12/20 : 174 lb (78. Disorder Maternal Cousin Male          at 43 from heart issue   • Other (Other) Paternal Grandmother         Bucio Batch disease      Social History:   Social History    Tobacco Use      Smoking status: Never Smoker      Smokeless tobacco: Never Used exam explained. Health maintenance. Body mass index is 25.51 kg/m². , recommended low fat diet and aerobic exercise 30 minutes three times weekly. The patient indicates understanding of these issues and agrees to the plan.   The patient is asked to return f

## 2020-08-21 ENCOUNTER — TELEPHONE (OUTPATIENT)
Dept: OBGYN CLINIC | Facility: CLINIC | Age: 35
End: 2020-08-21

## 2020-08-21 ENCOUNTER — POSTPARTUM (OUTPATIENT)
Dept: OBGYN CLINIC | Facility: CLINIC | Age: 35
End: 2020-08-21
Payer: COMMERCIAL

## 2020-08-21 VITALS — BODY MASS INDEX: 25 KG/M2 | SYSTOLIC BLOOD PRESSURE: 109 MMHG | DIASTOLIC BLOOD PRESSURE: 71 MMHG | WEIGHT: 152 LBS

## 2020-08-21 DIAGNOSIS — R22.2 ABDOMINAL WALL LUMP: Primary | ICD-10-CM

## 2020-08-21 PROCEDURE — 3074F SYST BP LT 130 MM HG: CPT | Performed by: OBSTETRICS & GYNECOLOGY

## 2020-08-21 PROCEDURE — 3078F DIAST BP <80 MM HG: CPT | Performed by: OBSTETRICS & GYNECOLOGY

## 2020-08-21 PROCEDURE — 99213 OFFICE O/P EST LOW 20 MIN: CPT | Performed by: OBSTETRICS & GYNECOLOGY

## 2020-08-21 NOTE — TELEPHONE ENCOUNTER
Pt just saw ASJ for 6 wk post partum, wants to talk about exercise & picking up other kids after having C section

## 2020-08-21 NOTE — PROGRESS NOTES
HPI:   Karina Myers is a 38year old female who presents for a pp visit. pt doing well. Pt requested evaluation of a possible lump/mass in her left lower abd wall superficially. No pain at this time, sees /feels it on/off.        Wt Readings from Last 6 Enc Grandfather         stomach   • Heart Disorder Maternal Cousin Male          at 43 from heart issue   • Other (Other) Paternal Grandmother         Deveron Ravens disease      Social History:   Social History    Tobacco Use      Smoking status: Never Smok is a 38year old female who presents for a pp visit. . Self breast exam explained. Health maintenance. Body mass index is 24.55 kg/m². , recommended low fat diet and aerobic exercise 30 minutes three times weekly.   The patient indicates understanding of th

## 2020-08-21 NOTE — TELEPHONE ENCOUNTER
Called pt, left message. Pt has a pending u/s to r/o hernia vs lump. I do not advise picking up kids or exercising until the ultrasound results return. Please call and inform.

## 2020-09-17 ENCOUNTER — PATIENT MESSAGE (OUTPATIENT)
Dept: OBGYN CLINIC | Facility: CLINIC | Age: 35
End: 2020-09-17

## 2020-09-21 NOTE — TELEPHONE ENCOUNTER
Pharm states that Dr Jodie Ferreira called in a Rx for the pt. For Nipple Ointment, but no demographics was given. Need pts phone number.

## 2020-09-21 NOTE — TELEPHONE ENCOUNTER
Regarding: RE: Non-Urgent Medical Question  ----- Message from Janessa Foreman sent at 9/21/2020 10:09 AM CDT -----       ----- Message from Reyes Mimes to John Angeles MD sent at 9/19/2020  5:32 PM -----   Thank you!      ----- Message -----       Haydee Tian using nystatin but it is getting worse. Is there anything else I can do? I had it with one of my boys and thought I got a prescription but can't remember.      Marla braga

## 2020-12-07 RX ORDER — FLUCONAZOLE 150 MG/1
150 TABLET ORAL ONCE
Qty: 1 TABLET | Refills: 0 | Status: SHIPPED | OUTPATIENT
Start: 2020-12-07 | End: 2020-12-07

## 2021-02-02 ENCOUNTER — OFFICE VISIT (OUTPATIENT)
Dept: OBGYN CLINIC | Facility: CLINIC | Age: 36
End: 2021-02-02
Payer: COMMERCIAL

## 2021-02-02 VITALS — BODY MASS INDEX: 23 KG/M2 | WEIGHT: 144 LBS | SYSTOLIC BLOOD PRESSURE: 100 MMHG | DIASTOLIC BLOOD PRESSURE: 65 MMHG

## 2021-02-02 DIAGNOSIS — N89.8 VAGINAL DISCHARGE: Primary | ICD-10-CM

## 2021-02-02 PROBLEM — O34.219 PREVIOUS CESAREAN DELIVERY, ANTEPARTUM CONDITION OR COMPLICATION: Status: RESOLVED | Noted: 2020-01-03 | Resolved: 2021-02-02

## 2021-02-02 PROBLEM — O09.299 CURRENT SINGLETON PREGNANCY WITH HISTORY OF CONGENITAL HEART DISEASE IN PRIOR CHILD, ANTEPARTUM: Status: RESOLVED | Noted: 2020-04-01 | Resolved: 2021-02-02

## 2021-02-02 PROBLEM — Z98.891 HISTORY OF CESAREAN SECTION: Status: ACTIVE | Noted: 2021-02-02

## 2021-02-02 PROBLEM — O43.122 VELAMENTOUS INSERTION OF UMBILICAL CORD IN SECOND TRIMESTER: Status: RESOLVED | Noted: 2020-04-01 | Resolved: 2021-02-02

## 2021-02-02 PROBLEM — O09.522 MULTIGRAVIDA OF ADVANCED MATERNAL AGE IN SECOND TRIMESTER: Status: RESOLVED | Noted: 2020-04-01 | Resolved: 2021-02-02

## 2021-02-02 PROBLEM — Z34.90 PREGNANCY: Status: RESOLVED | Noted: 2020-07-09 | Resolved: 2021-02-02

## 2021-02-02 PROCEDURE — 99213 OFFICE O/P EST LOW 20 MIN: CPT | Performed by: OBSTETRICS & GYNECOLOGY

## 2021-02-02 PROCEDURE — 3074F SYST BP LT 130 MM HG: CPT | Performed by: OBSTETRICS & GYNECOLOGY

## 2021-02-02 PROCEDURE — 3078F DIAST BP <80 MM HG: CPT | Performed by: OBSTETRICS & GYNECOLOGY

## 2021-02-02 RX ORDER — LEVOTHYROXINE SODIUM 75 MCG
TABLET ORAL
COMMUNITY
Start: 2020-11-28

## 2021-02-02 NOTE — PROGRESS NOTES
[facility], [facility]  Obstetrics and Gynecology  Focused Gynecology Problem Exam  Alessandro Palacio MD    Deya Sanchez is a 38year old female presenting for Gyn Exam (had yelllow discharge 2weeks ago no itching  no burning no other symptoms )  .     HPI:   Patient possibly   • History of chicken pox at age 1 and 1   • Human papilloma virus infection 2005    Colpo done.   All paps Negative since   • Other and unspecified hyperlipidemia 2005   • Other specified hypothyroidism 9/25/2019    Hashimoto's         Past Cephus Cheadle Comment: not during pregnancy      Drug use: No      Sexual activity: Not on file    Lifestyle      Physical activity        Days per week: Not on file        Minutes per session: Not on file      Stress: Not on file    Relationships      Social conne resolved symptomatically.    (N89.8) Vaginal discharge  (primary encounter diagnosis)  Plan: GENITAL VAGINOSIS SCREEN, GENITAL VAGINOSIS         SCREEN      PLAN:   A vaginosis panel was sent. We will notify patient of results.     ORDERS:   Orders Placed

## 2021-02-04 ENCOUNTER — TELEPHONE (OUTPATIENT)
Dept: OBGYN CLINIC | Facility: CLINIC | Age: 36
End: 2021-02-04

## 2021-02-04 LAB
GENITAL VAGINOSIS SCREEN: POSITIVE
TRICHOMONAS SCREEN: NEGATIVE

## 2021-02-04 RX ORDER — METRONIDAZOLE 7.5 MG/G
1 GEL VAGINAL NIGHTLY
Qty: 1 TUBE | Refills: 0 | Status: SHIPPED | OUTPATIENT
Start: 2021-02-04 | End: 2021-02-05 | Stop reason: CLARIF

## 2021-02-04 RX ORDER — METRONIDAZOLE 500 MG/1
500 TABLET ORAL 2 TIMES DAILY
Qty: 14 TABLET | Refills: 1 | Status: SHIPPED | OUTPATIENT
Start: 2021-02-04 | End: 2021-02-04

## 2021-02-04 NOTE — TELEPHONE ENCOUNTER
----- Message from Hailey Lind MD sent at 2/4/2021 11:56 AM CST -----  Please notify patient of positive BV result on vaginosis panel. I have sent Metrogell to her pharmacy. I notified her via [employer] as well.

## 2021-02-05 ENCOUNTER — PATIENT MESSAGE (OUTPATIENT)
Dept: OBGYN CLINIC | Facility: CLINIC | Age: 36
End: 2021-02-05

## 2021-02-05 RX ORDER — METRONIDAZOLE 7.5 MG/G
1 GEL VAGINAL NIGHTLY
Qty: 1 TUBE | Refills: 0 | Status: SHIPPED | OUTPATIENT
Start: 2021-02-05 | End: 2021-02-10

## 2021-02-05 NOTE — TELEPHONE ENCOUNTER
Colin Perez, RN 2/5/2021 9:58 AM CST      ----- Message -----  From: Irena Rabago  Sent: 2/5/2021 9:49 AM CST  To: Ayesha Beltran Ob/Gyne Clinical Staff  Subject: Prescription Question     Hi - my pharmacy still does not have the prescription.  Has it been sent

## 2021-10-05 ENCOUNTER — OFFICE VISIT (OUTPATIENT)
Dept: OBGYN CLINIC | Facility: CLINIC | Age: 36
End: 2021-10-05
Payer: COMMERCIAL

## 2021-10-05 VITALS — WEIGHT: 142 LBS | BODY MASS INDEX: 23 KG/M2 | SYSTOLIC BLOOD PRESSURE: 118 MMHG | DIASTOLIC BLOOD PRESSURE: 70 MMHG

## 2021-10-05 DIAGNOSIS — B37.3 VULVOVAGINITIS DUE TO YEAST: Primary | ICD-10-CM

## 2021-10-05 DIAGNOSIS — N92.3 INTERMENSTRUAL BLEEDING: ICD-10-CM

## 2021-10-05 PROCEDURE — 99214 OFFICE O/P EST MOD 30 MIN: CPT | Performed by: OBSTETRICS & GYNECOLOGY

## 2021-10-05 PROCEDURE — 3078F DIAST BP <80 MM HG: CPT | Performed by: OBSTETRICS & GYNECOLOGY

## 2021-10-05 PROCEDURE — 3074F SYST BP LT 130 MM HG: CPT | Performed by: OBSTETRICS & GYNECOLOGY

## 2021-10-05 RX ORDER — FLUCONAZOLE 150 MG/1
150 TABLET ORAL ONCE
Qty: 1 TABLET | Refills: 0 | Status: SHIPPED | OUTPATIENT
Start: 2021-10-05 | End: 2021-10-05

## 2021-10-05 NOTE — PROGRESS NOTES
[facility], [facility]  Obstetrics and Gynecology  Focused Gynecology Problem Exam  Mike Masterson MD    Farrah Paul is a 43year old female presenting for Gyn Exam (prolonged menstrual cycles, occasional vaginal itching x7days )  .     HPI:   Patient presents wit Other - see comments   3 SAB 2019 6w0d   U    FD   2 Term 17 38w2d  8 lb 3 oz (3.715 kg) M CS-LTranv EPI N ALIDA      Complications: Variable decelerations, Fetal intolerance to labor   1  09/22/15 36w0d  7 lb (3.175 kg) M NORMAL SPONT EPI Y Activity      Alcohol use:  Yes        Alcohol/week: 1.7 standard drinks        Types: 2 Glasses of wine per week        Comment: not during pregnancy      Drug use: No      Sexual activity: Not on file    Other Topics      Concerns:         Service Not on file    ROS:   See HPI  PHYSICAL EXAM:   /70   Wt 142 lb (64.4 kg)   LMP 01/17/2021   Breastfeeding Yes   BMI 22.93 kg/m²     GENERAL: well developed, well nourished, in no apparent distress  ABDOMEN: Soft, non distended; non tender, no masses

## 2021-10-26 ENCOUNTER — PATIENT MESSAGE (OUTPATIENT)
Dept: OBGYN CLINIC | Facility: CLINIC | Age: 36
End: 2021-10-26

## 2021-10-26 DIAGNOSIS — N92.3 INTERMENSTRUAL SPOTTING: Primary | ICD-10-CM

## 2021-10-29 NOTE — TELEPHONE ENCOUNTER
Celsa Allison RN 10/26/2021 2:33 PM CDT      ----- Message -----  From: Mary Junior  Sent: 10/26/2021 2:27 PM CDT  To: Ayesha Beltran Ob/Gyne Clinical Staff  Subject: Lucy Modi    I last got my period on October 12th and it stopped Octob

## 2022-03-18 ENCOUNTER — HOSPITAL ENCOUNTER (OUTPATIENT)
Dept: ULTRASOUND IMAGING | Facility: HOSPITAL | Age: 37
Discharge: HOME | End: 2022-03-18
Attending: OBSTETRICS & GYNECOLOGY
Payer: COMMERCIAL

## 2022-03-18 DIAGNOSIS — N92.3 INTERMENSTRUAL SPOTTING: ICD-10-CM

## 2022-03-18 PROCEDURE — 76856 US EXAM PELVIC COMPLETE: CPT | Performed by: OBSTETRICS & GYNECOLOGY

## 2022-03-18 PROCEDURE — 76830 TRANSVAGINAL US NON-OB: CPT | Performed by: OBSTETRICS & GYNECOLOGY

## 2022-06-30 DIAGNOSIS — E03.9 HYPOTHYROIDISM, UNSPECIFIED TYPE: Primary | ICD-10-CM

## 2022-06-30 RX ORDER — LEVOTHYROXINE SODIUM 88 UG/1
88 TABLET ORAL
Qty: 16 TABLET | Refills: 2 | Status: SHIPPED | OUTPATIENT
Start: 2022-06-30 | End: 2022-09-28

## 2022-06-30 RX ORDER — LEVOTHYROXINE SODIUM 88 MCG
88 TABLET ORAL
COMMUNITY
Start: 2022-06-25

## 2022-06-30 RX ORDER — LEVOTHYROXINE SODIUM 75 MCG
75 TABLET ORAL
COMMUNITY
Start: 2022-05-31 | End: 2022-06-30 | Stop reason: SDUPTHER

## 2022-06-30 RX ORDER — LEVOTHYROXINE SODIUM 75 MCG
75 TABLET ORAL
Qty: 12 TABLET | Refills: 2 | Status: SHIPPED | OUTPATIENT
Start: 2022-07-01 | End: 2022-09-22

## 2022-09-22 DIAGNOSIS — E03.9 HYPOTHYROIDISM, UNSPECIFIED TYPE: ICD-10-CM

## 2022-09-22 RX ORDER — LEVOTHYROXINE SODIUM 75 MCG
75 TABLET ORAL
Qty: 12 TABLET | Refills: 2 | Status: SHIPPED | OUTPATIENT
Start: 2022-09-23 | End: 2022-12-22

## 2022-10-22 DIAGNOSIS — E03.9 HYPOTHYROIDISM, UNSPECIFIED: ICD-10-CM

## 2022-10-26 RX ORDER — LEVOTHYROXINE SODIUM 88 MCG
TABLET ORAL
Qty: 16 TABLET | Refills: 2 | OUTPATIENT
Start: 2022-10-26

## 2023-08-06 NOTE — TELEPHONE ENCOUNTER
Per pt the the thrush is worse on both breast. In pain Thank you for choosing us for your care. I have placed an order for a prescription so that you can start treatment. View your full visit summary for details by clicking on the link below. Your pharmacist will able to address any questions you may have about the medication.     If you're not feeling better within 5-7 days, please schedule an appointment.  You can schedule an appointment right here in [facility], or call [phone]  If the visit is for the same symptoms as your eVisit, we'll refund the cost of your eVisit if seen within seven days.

## (undated) DEVICE — NON-ADHERENT PAD PREPACK: Brand: TELFA

## (undated) DEVICE — SUTURE VICRYL 2-0 CT-1

## (undated) DEVICE — REM POLYHESIVE ADULT PATIENT RETURN ELECTRODE: Brand: VALLEYLAB

## (undated) DEVICE — COVER SGL STRL LGHT HNDL BLU

## (undated) DEVICE — SUTURE VICRYL 0 J340H

## (undated) DEVICE — C SECTION PACK: Brand: MEDLINE INDUSTRIES, INC.

## (undated) DEVICE — TRAY CATH BDX IC 14FR 2L FL

## (undated) DEVICE — SPONGE: LAP 18X18 PW 200/CS: Brand: NOVAPLUS®

## (undated) DEVICE — UNDYED BRAIDED (POLYGLACTIN 910), SYNTHETIC ABSORBABLE SUTURE: Brand: COATED VICRYL

## (undated) DEVICE — 3M™ STERI-STRIP™ REINFORCED ADHESIVE SKIN CLOSURES, R1547, 1/2 IN X 4 IN (12 MM X 100 MM), 6 STRIPS/ENVELOPE: Brand: 3M™ STERI-STRIP™

## (undated) DEVICE — VIOLET BRAIDED (POLYGLACTIN 910), SYNTHETIC ABSORBABLE SUTURE: Brand: COATED VICRYL

## (undated) DEVICE — KENDALL SCD EXPRESS SLEEVES, KNEE LENGTH, MEDIUM: Brand: KENDALL SCD

## (undated) DEVICE — ABDOMINAL PAD: Brand: CURITY

## (undated) DEVICE — STERILE LATEX POWDER-FREE SURGICAL GLOVESWITH NITRILE COATING: Brand: PROTEXIS

## (undated) NOTE — LETTER
12/2/2019              Marla Nora        [address] [place] [phone]         Dear Gokul Catchmary records indicate that the pelvic ultrasound test ordered for you by Ani Walter MD  has not been done.   If you have, in fact, already com

## (undated) NOTE — IP AVS SNAPSHOT
Patient Demographics     Address Phone E-mail Address    [address] [place] [address] Jane [phone] (Home)  [phone] (Mobile) [email or web address]      Emergency Contact(s)     Name Relation Home Work [address] [facility] Spouse [phone] 3/14/2017 11:30 AM Gail Horowitz [facility], [address] [place] [facility]

## (undated) NOTE — IP AVS SNAPSHOT
[address]  [address] [facility] [phone]                Discharge Summary   5/25/2017    Jhoan Cummings           Admission Information        Provider Department    5/25/2017 Roger Morales MD [facility] Lluvia Flanagan [facility]  Diagnostics [facility] Parking) (Yellow Parking)  [address]   [address] [facility],  Reid Asa Burns, [address] [facility]  [address]  Ana Reed Patient [address] to help you get signed up for insurance coverage. Patient [address] is a Federal Navigator program that can help with your Affordable Care Act coverage, as well as all types of Medicaid plans.   To get signed up and covere

## (undated) NOTE — IP AVS SNAPSHOT
[address]  [address] [facility] [phone]                Discharge Summary   6/19/2017    Brandon Reilly           Admission Information        Provider Department    6/19/2017 Nilda Austin CNM [facility] 3se Where to Get Your Medications      These medications were sent to [address] [place] [address] [place], [address] [facility] [address], [phone], [facility], [place]anshu Jasmina [address]     Phone:  [phone] - docus possible for a visit in 2 weeks. Specialty:  OBSTETRICS & GYNECOLOGY    Why:  Post Op Visit    Contact information:    Ray  [address] [place] [address]  [phone]          Follow up with Yakelin Solomon CNM.  Schedule an appointment as soon ALT Bilirubin,Total Total Protein Albumin Sodium Potassium Chloride    (05/05/17)  15 -- -- -- -- -- --      Radiology Exams     None      Patient Education    Lactation Education-Mother Chart  Resolved   Pumping and breastfeeding frequency guidelines Res Signs and symptoms / prevention / management of plugged ducts / mastitis if applicable Resolved   Signs and symptoms / prevention / management of thrush if applicable Resolved   Hospital grade vs. personal pump use Resolved   Obtaining pump for home / work Call your doctor if you have excessive bleeding.   More than one pad per hour  Call Lactation Department with any questions/concerns [phone]      In the next few weeks you may be mailed a survey from us asking you to rate your experience here at [address] [place] [address]

## (undated) NOTE — MR AVS SNAPSHOT
After Visit Summary   9/25/2019    Desean Crum    MRN: [id number]           Visit Information     Date & Time  9/25/2019 10:45 AM Provider  Yakelin Solomon, [facility], [address] [place] [address],[address], [facility]/[employer].  Phone  [phone] complete it and provide feedback. We strive to deliver the best patient experience and are looking for ways to make improvements. Your feedback will help us do so. For more information on [employer], please visit [email or web address]

## (undated) NOTE — IP AVS SNAPSHOT
Patient Demographics     Address Phone E-mail Address    [address] [place] [address] Koffi [phone] (Home)  [phone] (Mobile) [email or web address]      Emergency Contact(s)     Name Relation Home Work [address] [facility] Spouse [phone] 6/5/2017 1:30 PM Prema Estrella Ra, [facility], [address] [place] [address]

## (undated) NOTE — LETTER
10/5/2019              [address] [facility]         Dear Justina Mccullough,    It was a pleasure to see you. Your PAP test with HPV was normal. Current guidelines would recommend a repeat Pap with HPV in 5 years.   An annual

## (undated) NOTE — IP AVS SNAPSHOT
[address]  602 [facility] [phone]                Discharge Summary   2/16/2017    Bonnie Quiroz           Admission Information        Provider Department    2/16/2017 Le Salazar MD [facility]  Diagnostics [facility]) ([address])  [address]   [address] [facility],  Reid Asa Burns, [address] [facility]  [address]  Con Cindy harming yourself, contact [facility] at [phone]. - If you don’t have insurance, Stephany Nogueira has partnered with Patient Kristen [place] to help you get signed up for insurance coverage.   Patient [place]